# Patient Record
Sex: MALE | Race: WHITE
[De-identification: names, ages, dates, MRNs, and addresses within clinical notes are randomized per-mention and may not be internally consistent; named-entity substitution may affect disease eponyms.]

---

## 2018-02-14 ENCOUNTER — HOSPITAL ENCOUNTER (OUTPATIENT)
Dept: HOSPITAL 15 - LAB | Age: 68
Discharge: HOME | End: 2018-02-14
Attending: ORTHOPAEDIC SURGERY
Payer: MEDICARE

## 2018-02-14 DIAGNOSIS — S83.102D: Primary | ICD-10-CM

## 2018-02-14 DIAGNOSIS — Z79.01: ICD-10-CM

## 2018-02-14 LAB
ALBUMIN SERPL-MCNC: 4 G/DL (ref 3.4–5)
ALP SERPL-CCNC: 121 U/L (ref 45–117)
ALT SERPL-CCNC: 30 U/L (ref 16–61)
ANION GAP SERPL CALCULATED.3IONS-SCNC: 7 MMOL/L (ref 5–15)
APTT PPP: 25.9 SEC (ref 22.64–33.71)
BILIRUB SERPL-MCNC: 0.3 MG/DL (ref 0.2–1)
BUN SERPL-MCNC: 24 MG/DL (ref 7–18)
BUN/CREAT SERPL: 24.5
CALCIUM SERPL-MCNC: 9.6 MG/DL (ref 8.5–10.1)
CHLORIDE SERPL-SCNC: 106 MMOL/L (ref 98–107)
CO2 SERPL-SCNC: 27 MMOL/L (ref 21–32)
GLUCOSE SERPL-MCNC: 100 MG/DL (ref 74–106)
HCT VFR BLD AUTO: 42.7 % (ref 41–53)
HGB BLD-MCNC: 14.2 G/DL (ref 13.5–17.5)
INR PPP: 0.88 (ref 0.9–1.15)
MCH RBC QN AUTO: 30.7 PG (ref 28–32)
MCV RBC AUTO: 92.3 FL (ref 80–100)
NRBC BLD QL AUTO: 0 %
POTASSIUM SERPL-SCNC: 5 MMOL/L (ref 3.5–5.1)
PROT SERPL-MCNC: 7.8 G/DL (ref 6.4–8.2)
PROTHROMBIN TIME: 9.6 SEC (ref 9.37–12.3)
SODIUM SERPL-SCNC: 140 MMOL/L (ref 136–145)

## 2018-02-14 PROCEDURE — 85730 THROMBOPLASTIN TIME PARTIAL: CPT

## 2018-02-14 PROCEDURE — 36415 COLL VENOUS BLD VENIPUNCTURE: CPT

## 2018-02-14 PROCEDURE — 80053 COMPREHEN METABOLIC PANEL: CPT

## 2018-02-14 PROCEDURE — 85610 PROTHROMBIN TIME: CPT

## 2018-02-14 PROCEDURE — 85025 COMPLETE CBC W/AUTO DIFF WBC: CPT

## 2018-02-14 PROCEDURE — 81001 URINALYSIS AUTO W/SCOPE: CPT

## 2019-06-04 ENCOUNTER — HOSPITAL ENCOUNTER (OUTPATIENT)
Dept: HOSPITAL 15 - XYW | Age: 69
Discharge: HOME | End: 2019-06-04
Attending: INTERNAL MEDICINE
Payer: MEDICARE

## 2019-06-04 VITALS — SYSTOLIC BLOOD PRESSURE: 136 MMHG | DIASTOLIC BLOOD PRESSURE: 78 MMHG

## 2019-06-04 VITALS — BODY MASS INDEX: 28.31 KG/M2 | WEIGHT: 209 LBS | HEIGHT: 72 IN

## 2019-06-04 DIAGNOSIS — Z86.711: ICD-10-CM

## 2019-06-04 DIAGNOSIS — G43.909: ICD-10-CM

## 2019-06-04 DIAGNOSIS — I10: Primary | ICD-10-CM

## 2019-06-04 DIAGNOSIS — Z87.898: ICD-10-CM

## 2019-06-04 DIAGNOSIS — I49.8: ICD-10-CM

## 2019-06-04 PROCEDURE — 78452 HT MUSCLE IMAGE SPECT MULT: CPT

## 2019-06-04 PROCEDURE — 93017 CV STRESS TEST TRACING ONLY: CPT

## 2019-06-07 ENCOUNTER — HOSPITAL ENCOUNTER (OUTPATIENT)
Dept: HOSPITAL 15 - LAB | Age: 69
Discharge: HOME | End: 2019-06-07
Attending: INTERNAL MEDICINE
Payer: MEDICARE

## 2019-06-07 DIAGNOSIS — R79.1: ICD-10-CM

## 2019-06-07 DIAGNOSIS — R79.89: ICD-10-CM

## 2019-06-07 DIAGNOSIS — Z01.812: Primary | ICD-10-CM

## 2019-06-07 LAB
ALBUMIN SERPL-MCNC: 3.8 G/DL (ref 3.4–5)
ALP SERPL-CCNC: 133 U/L (ref 45–117)
ALT SERPL-CCNC: 53 U/L (ref 16–61)
ANION GAP SERPL CALCULATED.3IONS-SCNC: 8 MMOL/L (ref 5–15)
APTT PPP: 27.2 SEC (ref 23.64–32.05)
BILIRUB SERPL-MCNC: 0.3 MG/DL (ref 0.2–1)
BUN SERPL-MCNC: 18 MG/DL (ref 7–18)
BUN/CREAT SERPL: 15.5
CALCIUM SERPL-MCNC: 8.8 MG/DL (ref 8.5–10.1)
CHLORIDE SERPL-SCNC: 108 MMOL/L (ref 98–107)
CO2 SERPL-SCNC: 26 MMOL/L (ref 21–32)
GLUCOSE SERPL-MCNC: 111 MG/DL (ref 74–106)
HCT VFR BLD AUTO: 42.9 % (ref 41–53)
HGB BLD-MCNC: 14.3 G/DL (ref 13.5–17.5)
INR PPP: 0.89 (ref 0.9–1.15)
MCH RBC QN AUTO: 30.8 PG (ref 28–32)
MCV RBC AUTO: 92.3 FL (ref 80–100)
NRBC BLD QL AUTO: 0.1 %
POTASSIUM SERPL-SCNC: 4.4 MMOL/L (ref 3.5–5.1)
PROT SERPL-MCNC: 7.6 G/DL (ref 6.4–8.2)
SODIUM SERPL-SCNC: 142 MMOL/L (ref 136–145)

## 2019-06-07 PROCEDURE — 80053 COMPREHEN METABOLIC PANEL: CPT

## 2019-06-07 PROCEDURE — 85025 COMPLETE CBC W/AUTO DIFF WBC: CPT

## 2019-06-07 PROCEDURE — 36415 COLL VENOUS BLD VENIPUNCTURE: CPT

## 2019-06-07 PROCEDURE — 85730 THROMBOPLASTIN TIME PARTIAL: CPT

## 2019-06-07 PROCEDURE — 85610 PROTHROMBIN TIME: CPT

## 2019-06-10 ENCOUNTER — HOSPITAL ENCOUNTER (OUTPATIENT)
Dept: HOSPITAL 15 - CATH | Age: 69
Discharge: HOME | End: 2019-06-10
Attending: INTERNAL MEDICINE
Payer: MEDICARE

## 2019-06-10 VITALS — HEIGHT: 69 IN | WEIGHT: 211 LBS | BODY MASS INDEX: 31.25 KG/M2

## 2019-06-10 DIAGNOSIS — G43.909: ICD-10-CM

## 2019-06-10 DIAGNOSIS — I48.91: Primary | ICD-10-CM

## 2019-06-10 DIAGNOSIS — Z88.0: ICD-10-CM

## 2019-06-10 DIAGNOSIS — Z82.49: ICD-10-CM

## 2019-06-10 DIAGNOSIS — Z79.899: ICD-10-CM

## 2019-06-10 DIAGNOSIS — Z86.711: ICD-10-CM

## 2019-06-10 DIAGNOSIS — Z87.898: ICD-10-CM

## 2019-06-10 DIAGNOSIS — G47.33: ICD-10-CM

## 2019-06-10 DIAGNOSIS — J45.909: ICD-10-CM

## 2019-06-10 DIAGNOSIS — I10: ICD-10-CM

## 2019-06-10 PROCEDURE — 92960 CARDIOVERSION ELECTRIC EXT: CPT

## 2019-06-10 PROCEDURE — 93312 ECHO TRANSESOPHAGEAL: CPT

## 2019-06-10 PROCEDURE — 36415 COLL VENOUS BLD VENIPUNCTURE: CPT

## 2019-06-10 PROCEDURE — 93005 ELECTROCARDIOGRAM TRACING: CPT

## 2019-06-10 PROCEDURE — 80162 ASSAY OF DIGOXIN TOTAL: CPT

## 2019-06-10 PROCEDURE — 99152 MOD SED SAME PHYS/QHP 5/>YRS: CPT

## 2020-01-28 ENCOUNTER — HOSPITAL ENCOUNTER (OUTPATIENT)
Dept: HOSPITAL 15 - XY | Age: 70
Discharge: HOME | End: 2020-01-28
Attending: INTERNAL MEDICINE
Payer: MEDICARE

## 2020-01-28 VITALS — SYSTOLIC BLOOD PRESSURE: 149 MMHG | DIASTOLIC BLOOD PRESSURE: 92 MMHG

## 2020-01-28 VITALS — BODY MASS INDEX: 31.25 KG/M2 | WEIGHT: 211 LBS | HEIGHT: 69 IN

## 2020-01-28 DIAGNOSIS — I48.91: ICD-10-CM

## 2020-01-28 DIAGNOSIS — I10: Primary | ICD-10-CM

## 2020-01-28 PROCEDURE — 93017 CV STRESS TEST TRACING ONLY: CPT

## 2020-01-28 PROCEDURE — 78452 HT MUSCLE IMAGE SPECT MULT: CPT

## 2020-01-31 ENCOUNTER — HOSPITAL ENCOUNTER (OUTPATIENT)
Dept: HOSPITAL 15 - XYW | Age: 70
Discharge: HOME | End: 2020-01-31
Attending: INTERNAL MEDICINE
Payer: MEDICARE

## 2020-01-31 DIAGNOSIS — I11.9: Primary | ICD-10-CM

## 2020-01-31 PROCEDURE — 93306 TTE W/DOPPLER COMPLETE: CPT

## 2020-03-02 ENCOUNTER — HOSPITAL ENCOUNTER (OUTPATIENT)
Dept: HOSPITAL 15 - LAB | Age: 70
Discharge: HOME | End: 2020-03-02
Attending: INTERNAL MEDICINE
Payer: MEDICARE

## 2020-03-02 DIAGNOSIS — I10: Primary | ICD-10-CM

## 2020-03-02 DIAGNOSIS — Z87.898: ICD-10-CM

## 2020-03-02 LAB
ALBUMIN SERPL-MCNC: 3.7 G/DL (ref 3.4–5)
ALP SERPL-CCNC: 157 U/L (ref 45–117)
ALT SERPL-CCNC: 59 U/L (ref 16–61)
ANION GAP SERPL CALCULATED.3IONS-SCNC: 4 MMOL/L (ref 5–15)
BILIRUB SERPL-MCNC: 0.4 MG/DL (ref 0.2–1)
BUN SERPL-MCNC: 22 MG/DL (ref 7–18)
BUN/CREAT SERPL: 19.6
CALCIUM SERPL-MCNC: 9 MG/DL (ref 8.5–10.1)
CHLORIDE SERPL-SCNC: 107 MMOL/L (ref 98–107)
CHOLEST SERPL-MCNC: 215 MG/DL (ref ?–200)
CO2 SERPL-SCNC: 28 MMOL/L (ref 21–32)
GLUCOSE SERPL-MCNC: 97 MG/DL (ref 74–106)
HCT VFR BLD AUTO: 41.2 % (ref 41–53)
HDLC SERPL-MCNC: 61 MG/DL (ref 40–59)
HGB BLD-MCNC: 13.6 G/DL (ref 13.5–17.5)
MCH RBC QN AUTO: 30.6 PG (ref 28–32)
MCV RBC AUTO: 92.9 FL (ref 80–100)
NRBC BLD QL AUTO: 0 %
POTASSIUM SERPL-SCNC: 4.2 MMOL/L (ref 3.5–5.1)
PROT SERPL-MCNC: 7.5 G/DL (ref 6.4–8.2)
SODIUM SERPL-SCNC: 139 MMOL/L (ref 136–145)
TRIGL SERPL-MCNC: 124 MG/DL (ref ?–150)

## 2020-03-02 PROCEDURE — 85025 COMPLETE CBC W/AUTO DIFF WBC: CPT

## 2020-03-02 PROCEDURE — 80061 LIPID PANEL: CPT

## 2020-03-02 PROCEDURE — 36415 COLL VENOUS BLD VENIPUNCTURE: CPT

## 2020-03-02 PROCEDURE — 80053 COMPREHEN METABOLIC PANEL: CPT

## 2020-06-01 LAB
ALBUMIN SERPL-MCNC: 3.5 G/DL (ref 3.4–5)
ALP SERPL-CCNC: 117 U/L (ref 45–117)
ALT SERPL-CCNC: 60 U/L (ref 16–61)
ANION GAP SERPL CALCULATED.3IONS-SCNC: 4 MMOL/L (ref 5–15)
APTT PPP: 26.2 SEC (ref 23.64–32.05)
BILIRUB SERPL-MCNC: 0.4 MG/DL (ref 0.2–1)
BUN SERPL-MCNC: 16 MG/DL (ref 7–18)
BUN/CREAT SERPL: 13.8
CALCIUM SERPL-MCNC: 8.9 MG/DL (ref 8.5–10.1)
CHLORIDE SERPL-SCNC: 110 MMOL/L (ref 98–107)
CO2 SERPL-SCNC: 29 MMOL/L (ref 21–32)
GLUCOSE SERPL-MCNC: 99 MG/DL (ref 74–106)
HCT VFR BLD AUTO: 37.4 % (ref 41–53)
HGB BLD-MCNC: 12.3 G/DL (ref 13.5–17.5)
INR PPP: 0.97 (ref 0.9–1.15)
MCH RBC QN AUTO: 30.1 PG (ref 28–32)
MCV RBC AUTO: 91.3 FL (ref 80–100)
NRBC BLD QL AUTO: 0 %
POTASSIUM SERPL-SCNC: 4.5 MMOL/L (ref 3.5–5.1)
PROT SERPL-MCNC: 7.3 G/DL (ref 6.4–8.2)
SODIUM SERPL-SCNC: 143 MMOL/L (ref 136–145)

## 2020-06-03 ENCOUNTER — HOSPITAL ENCOUNTER (INPATIENT)
Dept: HOSPITAL 15 - CATH | Age: 70
LOS: 1 days | Discharge: HOME | DRG: 246 | End: 2020-06-04
Attending: INTERNAL MEDICINE | Admitting: INTERNAL MEDICINE
Payer: MEDICARE

## 2020-06-03 VITALS — HEIGHT: 69 IN | WEIGHT: 197.31 LBS | BODY MASS INDEX: 29.22 KG/M2

## 2020-06-03 VITALS — SYSTOLIC BLOOD PRESSURE: 135 MMHG | DIASTOLIC BLOOD PRESSURE: 82 MMHG

## 2020-06-03 VITALS — SYSTOLIC BLOOD PRESSURE: 138 MMHG | DIASTOLIC BLOOD PRESSURE: 81 MMHG

## 2020-06-03 DIAGNOSIS — Z11.59: ICD-10-CM

## 2020-06-03 DIAGNOSIS — I25.42: ICD-10-CM

## 2020-06-03 DIAGNOSIS — J45.909: ICD-10-CM

## 2020-06-03 DIAGNOSIS — I25.10: Primary | ICD-10-CM

## 2020-06-03 DIAGNOSIS — Z88.1: ICD-10-CM

## 2020-06-03 DIAGNOSIS — Z88.0: ICD-10-CM

## 2020-06-03 DIAGNOSIS — E78.5: ICD-10-CM

## 2020-06-03 DIAGNOSIS — Z86.711: ICD-10-CM

## 2020-06-03 DIAGNOSIS — I10: ICD-10-CM

## 2020-06-03 DIAGNOSIS — I48.91: ICD-10-CM

## 2020-06-03 PROCEDURE — 92928 PRQ TCAT PLMT NTRAC ST 1 LES: CPT

## 2020-06-03 PROCEDURE — 85025 COMPLETE CBC W/AUTO DIFF WBC: CPT

## 2020-06-03 PROCEDURE — 82565 ASSAY OF CREATININE: CPT

## 2020-06-03 PROCEDURE — 85610 PROTHROMBIN TIME: CPT

## 2020-06-03 PROCEDURE — 99152 MOD SED SAME PHYS/QHP 5/>YRS: CPT

## 2020-06-03 PROCEDURE — 80053 COMPREHEN METABOLIC PANEL: CPT

## 2020-06-03 PROCEDURE — B211YZZ FLUOROSCOPY OF MULTIPLE CORONARY ARTERIES USING OTHER CONTRAST: ICD-10-PCS | Performed by: INTERNAL MEDICINE

## 2020-06-03 PROCEDURE — 36415 COLL VENOUS BLD VENIPUNCTURE: CPT

## 2020-06-03 PROCEDURE — B241ZZ3 ULTRASONOGRAPHY OF MULTIPLE CORONARY ARTERIES, INTRAVASCULAR: ICD-10-PCS | Performed by: INTERNAL MEDICINE

## 2020-06-03 PROCEDURE — 4A023N8 MEASUREMENT OF CARDIAC SAMPLING AND PRESSURE, BILATERAL, PERCUTANEOUS APPROACH: ICD-10-PCS | Performed by: INTERNAL MEDICINE

## 2020-06-03 PROCEDURE — 92979 ENDOLUMINL IVUS OCT C EA: CPT

## 2020-06-03 PROCEDURE — 85730 THROMBOPLASTIN TIME PARTIAL: CPT

## 2020-06-03 PROCEDURE — B215YZZ FLUOROSCOPY OF LEFT HEART USING OTHER CONTRAST: ICD-10-PCS | Performed by: INTERNAL MEDICINE

## 2020-06-03 PROCEDURE — 027236Z DILATION OF CORONARY ARTERY, THREE ARTERIES WITH THREE DRUG-ELUTING INTRALUMINAL DEVICES, PERCUTANEOUS APPROACH: ICD-10-PCS | Performed by: INTERNAL MEDICINE

## 2020-06-03 PROCEDURE — 92978 ENDOLUMINL IVUS OCT C 1ST: CPT

## 2020-06-03 PROCEDURE — 93460 R&L HRT ART/VENTRICLE ANGIO: CPT

## 2020-06-03 PROCEDURE — 99153 MOD SED SAME PHYS/QHP EA: CPT

## 2020-06-04 VITALS — DIASTOLIC BLOOD PRESSURE: 79 MMHG | SYSTOLIC BLOOD PRESSURE: 130 MMHG

## 2020-06-04 VITALS — SYSTOLIC BLOOD PRESSURE: 136 MMHG | DIASTOLIC BLOOD PRESSURE: 86 MMHG

## 2020-06-04 VITALS — DIASTOLIC BLOOD PRESSURE: 86 MMHG | SYSTOLIC BLOOD PRESSURE: 136 MMHG

## 2020-06-04 VITALS — DIASTOLIC BLOOD PRESSURE: 89 MMHG | SYSTOLIC BLOOD PRESSURE: 159 MMHG

## 2020-11-12 ENCOUNTER — HOSPITAL ENCOUNTER (OUTPATIENT)
Dept: HOSPITAL 15 - XY | Age: 70
Discharge: HOME | End: 2020-11-12
Attending: INTERNAL MEDICINE
Payer: MEDICARE

## 2020-11-12 VITALS — BODY MASS INDEX: 30.66 KG/M2 | WEIGHT: 207 LBS | HEIGHT: 69 IN

## 2020-11-12 DIAGNOSIS — M17.10: ICD-10-CM

## 2020-11-12 DIAGNOSIS — I25.10: Primary | ICD-10-CM

## 2020-11-12 DIAGNOSIS — Z95.9: ICD-10-CM

## 2020-11-12 DIAGNOSIS — R06.02: ICD-10-CM

## 2020-11-12 PROCEDURE — 93017 CV STRESS TEST TRACING ONLY: CPT

## 2020-11-12 PROCEDURE — 78452 HT MUSCLE IMAGE SPECT MULT: CPT

## 2021-06-01 ENCOUNTER — HOSPITAL ENCOUNTER (OUTPATIENT)
Dept: HOSPITAL 15 - LAB | Age: 71
Discharge: HOME | End: 2021-06-01
Attending: INTERNAL MEDICINE
Payer: MEDICARE

## 2021-06-01 DIAGNOSIS — Z01.812: Primary | ICD-10-CM

## 2021-06-01 LAB
ALBUMIN SERPL-MCNC: 3.6 G/DL (ref 3.4–5)
ALP SERPL-CCNC: 118 U/L (ref 45–117)
ALT SERPL-CCNC: 51 U/L (ref 16–61)
ANION GAP SERPL CALCULATED.3IONS-SCNC: 7 MMOL/L (ref 5–15)
APTT PPP: 26.9 SEC (ref 23–31.2)
BILIRUB SERPL-MCNC: 0.5 MG/DL (ref 0.2–1)
BUN SERPL-MCNC: 27 MG/DL (ref 7–18)
BUN/CREAT SERPL: 25
CALCIUM SERPL-MCNC: 8.8 MG/DL (ref 8.5–10.1)
CHLORIDE SERPL-SCNC: 106 MMOL/L (ref 98–107)
CO2 SERPL-SCNC: 24 MMOL/L (ref 21–32)
GLUCOSE SERPL-MCNC: 105 MG/DL (ref 74–106)
HCT VFR BLD AUTO: 36.9 % (ref 41–53)
HGB BLD-MCNC: 12.5 G/DL (ref 13.5–17.5)
INR PPP: 0.93 (ref 0.9–1.15)
MCH RBC QN AUTO: 31.3 PG (ref 28–32)
MCV RBC AUTO: 92.2 FL (ref 80–100)
NRBC BLD QL AUTO: 0.1 %
POTASSIUM SERPL-SCNC: 4.5 MMOL/L (ref 3.5–5.1)
PROT SERPL-MCNC: 7.3 G/DL (ref 6.4–8.2)
SODIUM SERPL-SCNC: 137 MMOL/L (ref 136–145)

## 2021-06-01 PROCEDURE — 85025 COMPLETE CBC W/AUTO DIFF WBC: CPT

## 2021-06-01 PROCEDURE — 36415 COLL VENOUS BLD VENIPUNCTURE: CPT

## 2021-06-01 PROCEDURE — 81001 URINALYSIS AUTO W/SCOPE: CPT

## 2021-06-01 PROCEDURE — 80053 COMPREHEN METABOLIC PANEL: CPT

## 2021-06-01 PROCEDURE — 85610 PROTHROMBIN TIME: CPT

## 2021-06-01 PROCEDURE — 85730 THROMBOPLASTIN TIME PARTIAL: CPT

## 2022-02-11 ENCOUNTER — HOSPITAL ENCOUNTER (OUTPATIENT)
Dept: HOSPITAL 15 - LAB | Age: 72
Discharge: HOME | End: 2022-02-11
Attending: INTERNAL MEDICINE
Payer: MEDICARE

## 2022-02-11 DIAGNOSIS — I25.810: Primary | ICD-10-CM

## 2022-02-11 DIAGNOSIS — Z95.5: ICD-10-CM

## 2022-02-11 LAB
ALBUMIN SERPL-MCNC: 3.7 G/DL (ref 3.4–5)
ALP SERPL-CCNC: 103 U/L (ref 45–117)
ALT SERPL-CCNC: 32 U/L (ref 16–61)
ANION GAP SERPL CALCULATED.3IONS-SCNC: 4 MMOL/L (ref 5–15)
BILIRUB SERPL-MCNC: 0.5 MG/DL (ref 0.2–1)
BUN SERPL-MCNC: 16 MG/DL (ref 7–18)
BUN/CREAT SERPL: 17
CALCIUM SERPL-MCNC: 9.1 MG/DL (ref 8.5–10.1)
CHLORIDE SERPL-SCNC: 112 MMOL/L (ref 98–107)
CO2 SERPL-SCNC: 26 MMOL/L (ref 21–32)
GLUCOSE SERPL-MCNC: 117 MG/DL (ref 74–106)
POTASSIUM SERPL-SCNC: 4 MMOL/L (ref 3.5–5.1)
PROT SERPL-MCNC: 7.2 G/DL (ref 6.4–8.2)
SODIUM SERPL-SCNC: 142 MMOL/L (ref 136–145)

## 2022-02-11 PROCEDURE — 80053 COMPREHEN METABOLIC PANEL: CPT

## 2022-02-11 PROCEDURE — 36415 COLL VENOUS BLD VENIPUNCTURE: CPT

## 2025-01-24 ENCOUNTER — HOSPITAL ENCOUNTER (INPATIENT)
Dept: HOSPITAL 15 - ER | Age: 75
LOS: 4 days | Discharge: HOME | DRG: 640 | End: 2025-01-28
Attending: STUDENT IN AN ORGANIZED HEALTH CARE EDUCATION/TRAINING PROGRAM | Admitting: STUDENT IN AN ORGANIZED HEALTH CARE EDUCATION/TRAINING PROGRAM
Payer: MEDICARE

## 2025-01-24 VITALS — RESPIRATION RATE: 18 BRPM | HEART RATE: 79 BPM | OXYGEN SATURATION: 99 %

## 2025-01-24 VITALS — WEIGHT: 228.62 LBS | BODY MASS INDEX: 33.86 KG/M2 | HEIGHT: 69 IN

## 2025-01-24 DIAGNOSIS — G20.A1: ICD-10-CM

## 2025-01-24 DIAGNOSIS — Z82.49: ICD-10-CM

## 2025-01-24 DIAGNOSIS — Z95.5: ICD-10-CM

## 2025-01-24 DIAGNOSIS — Z90.49: ICD-10-CM

## 2025-01-24 DIAGNOSIS — I13.2: ICD-10-CM

## 2025-01-24 DIAGNOSIS — I50.32: ICD-10-CM

## 2025-01-24 DIAGNOSIS — I25.10: ICD-10-CM

## 2025-01-24 DIAGNOSIS — Z83.3: ICD-10-CM

## 2025-01-24 DIAGNOSIS — E87.6: ICD-10-CM

## 2025-01-24 DIAGNOSIS — E83.52: Primary | ICD-10-CM

## 2025-01-24 DIAGNOSIS — N17.0: ICD-10-CM

## 2025-01-24 DIAGNOSIS — I48.91: ICD-10-CM

## 2025-01-24 DIAGNOSIS — K35.80: ICD-10-CM

## 2025-01-24 DIAGNOSIS — E67.8: ICD-10-CM

## 2025-01-24 LAB
ANION GAP SERPL CALCULATED.3IONS-SCNC: 7 MMOL/L (ref 5–15)
ANION GAP SERPL CALCULATED.3IONS-SCNC: 7 MMOL/L (ref 5–15)
APTT PPP: 24.3 SEC (ref 24.5–34.5)
BUN SERPL-MCNC: 27 MG/DL (ref 9–23)
BUN SERPL-MCNC: 28 MG/DL (ref 9–23)
BUN/CREAT SERPL: 20.1 (ref 10–20)
BUN/CREAT SERPL: 23.1 (ref 10–20)
CALCIUM SERPL-MCNC: 13.9 MG/DL (ref 8.7–10.4)
CALCIUM SERPL-MCNC: 15.2 MG/DL (ref 8.7–10.4)
CHLORIDE SERPL-SCNC: 101 MMOL/L (ref 98–107)
CHLORIDE SERPL-SCNC: 97 MMOL/L (ref 98–107)
CO2 SERPL-SCNC: 32 MMOL/L (ref 20–31)
CO2 SERPL-SCNC: 34 MMOL/L (ref 20–31)
GLUCOSE SERPL-MCNC: 95 MG/DL (ref 74–106)
GLUCOSE SERPL-MCNC: 96 MG/DL (ref 74–106)
HCT VFR BLD AUTO: 38.7 % (ref 41–53)
HGB BLD-MCNC: 13 G/DL (ref 13.5–17.5)
INR PPP: 1.01 (ref 0.9–1.15)
MAGNESIUM SERPL-MCNC: 1.9 MG/DL (ref 1.6–2.6)
MCH RBC QN AUTO: 29 PG (ref 28–32)
MCV RBC AUTO: 85.8 FL (ref 80–100)
NRBC BLD QL AUTO: 0.1 %
POTASSIUM SERPL-SCNC: 2.9 MMOL/L (ref 3.5–5.1)
POTASSIUM SERPL-SCNC: 2.9 MMOL/L (ref 3.5–5.1)
PROTHROMBIN TIME: 10.7 SEC (ref 9.3–11.8)
SODIUM SERPL-SCNC: 138 MMOL/L (ref 136–145)
SODIUM SERPL-SCNC: 140 MMOL/L (ref 136–145)

## 2025-01-24 PROCEDURE — 82040 ASSAY OF SERUM ALBUMIN: CPT

## 2025-01-24 PROCEDURE — 83735 ASSAY OF MAGNESIUM: CPT

## 2025-01-24 PROCEDURE — 36415 COLL VENOUS BLD VENIPUNCTURE: CPT

## 2025-01-24 PROCEDURE — 83835 ASSAY OF METANEPHRINES: CPT

## 2025-01-24 PROCEDURE — 85610 PROTHROMBIN TIME: CPT

## 2025-01-24 PROCEDURE — 84443 ASSAY THYROID STIM HORMONE: CPT

## 2025-01-24 PROCEDURE — 83615 LACTATE (LD) (LDH) ENZYME: CPT

## 2025-01-24 PROCEDURE — 74176 CT ABD & PELVIS W/O CONTRAST: CPT

## 2025-01-24 PROCEDURE — 83970 ASSAY OF PARATHORMONE: CPT

## 2025-01-24 PROCEDURE — 84155 ASSAY OF PROTEIN SERUM: CPT

## 2025-01-24 PROCEDURE — 74177 CT ABD & PELVIS W/CONTRAST: CPT

## 2025-01-24 PROCEDURE — 83605 ASSAY OF LACTIC ACID: CPT

## 2025-01-24 PROCEDURE — 80053 COMPREHEN METABOLIC PANEL: CPT

## 2025-01-24 PROCEDURE — 82088 ASSAY OF ALDOSTERONE: CPT

## 2025-01-24 PROCEDURE — 81001 URINALYSIS AUTO W/SCOPE: CPT

## 2025-01-24 PROCEDURE — 97163 PT EVAL HIGH COMPLEX 45 MIN: CPT

## 2025-01-24 PROCEDURE — 71260 CT THORAX DX C+: CPT

## 2025-01-24 PROCEDURE — 97110 THERAPEUTIC EXERCISES: CPT

## 2025-01-24 PROCEDURE — 85730 THROMBOPLASTIN TIME PARTIAL: CPT

## 2025-01-24 PROCEDURE — 84154 ASSAY OF PSA FREE: CPT

## 2025-01-24 PROCEDURE — 82378 CARCINOEMBRYONIC ANTIGEN: CPT

## 2025-01-24 PROCEDURE — 84100 ASSAY OF PHOSPHORUS: CPT

## 2025-01-24 PROCEDURE — 84132 ASSAY OF SERUM POTASSIUM: CPT

## 2025-01-24 PROCEDURE — 93306 TTE W/DOPPLER COMPLETE: CPT

## 2025-01-24 PROCEDURE — 82310 ASSAY OF CALCIUM: CPT

## 2025-01-24 PROCEDURE — 82652 VIT D 1 25-DIHYDROXY: CPT

## 2025-01-24 PROCEDURE — 87086 URINE CULTURE/COLONY COUNT: CPT

## 2025-01-24 PROCEDURE — 80048 BASIC METABOLIC PNL TOTAL CA: CPT

## 2025-01-24 PROCEDURE — 82306 VITAMIN D 25 HYDROXY: CPT

## 2025-01-24 PROCEDURE — 71045 X-RAY EXAM CHEST 1 VIEW: CPT

## 2025-01-24 PROCEDURE — 84244 ASSAY OF RENIN: CPT

## 2025-01-24 PROCEDURE — 87040 BLOOD CULTURE FOR BACTERIA: CPT

## 2025-01-24 PROCEDURE — 83880 ASSAY OF NATRIURETIC PEPTIDE: CPT

## 2025-01-24 PROCEDURE — 97116 GAIT TRAINING THERAPY: CPT

## 2025-01-24 PROCEDURE — 85025 COMPLETE CBC W/AUTO DIFF WBC: CPT

## 2025-01-24 PROCEDURE — 93975 VASCULAR STUDY: CPT

## 2025-01-24 RX ADMIN — METOPROLOL SUCCINATE SCH MG: 50 TABLET, EXTENDED RELEASE ORAL at 22:16

## 2025-01-24 RX ADMIN — HYDRALAZINE HYDROCHLORIDE ONE MG: 20 INJECTION INTRAMUSCULAR; INTRAVENOUS at 22:29

## 2025-01-24 RX ADMIN — SODIUM CHLORIDE ONE MLS/HR: 0.9 INJECTION, SOLUTION INTRAVENOUS at 16:49

## 2025-01-24 RX ADMIN — SODIUM CHLORIDE SCH MLS/HR: 0.9 INJECTION, SOLUTION INTRAVENOUS at 22:15

## 2025-01-24 RX ADMIN — POTASSIUM CHLORIDE SCH MLS/HR: 200 INJECTION, SOLUTION INTRAVENOUS at 03:30

## 2025-01-24 NOTE — ED.PDOC
GI ASSESSMENT


HPI Comments


74-year-old male who comes in with chief complaint of abdominal pain with 

possible UTI.  The patient has had a low-grade fever according to his family.  

The patient has been more lethargic.  The patient's pain is in the left lower 

quadrant and seems to radiate towards his back.  The pain has been increasing 

somewhat over the past two weeks but the odiferous urine has been going on for 

two days.  The patient's blood pressure was also significantly elevated.


Chief Complaint:  Urinary


Time Seen by MD:  15:16


Primary Care Provider:  unknown


Reviewed Notes:  Nurses Notes, Paramedic Notes, Medications, Allergies 

(Allergies listed above)


Allergies:  


Coded Allergies:  


     Cephalexin (Verified  Allergy, Unknown, RASH, 6/7/19)


     Penicillins (Verified  Allergy, Unknown, RASH, 6/7/19)


Home Meds


Active Scripts


Apixaban Base (ELIQUIS) 5 Mg Tab, 5 MG PO BID for 30 Days, #60 TAB


   Prov:MORAIMA DAY MD         4/22/19


Reported Medications


Aspirin-Caffeine (Anacin 400-32 mg) 1 Tab Tab, 1 TAB PO DAILY, TAB


   6/1/20


Hydrocodone-Acetaminophen (Norco  mg) 1 Tab Tab, 1 TAB PO BID, TAB


   6/1/20


Rosuvastatin Calcium (Crestor) 40 Mg Tab, 1 TAB PO DAILY, #30 TAB 5 Refills


   6/1/20


Losartan Potassium (Losartan Potassium) 100 Mg Tab, 100 MG PO DAILY for 30 Days,

MG


   6/1/20


Pantoprazole Sodium Sesquihydr (Pantoprazole Sodium) 40 Mg Tab, 40 MG PO DAILY, 

TAB


   6/7/19


Metoprolol Succinate (Metoprolol Succinate Er) 50 Mg Tab, 50 MG PO DAILY for 30 

Days, MG


   6/7/19


Colestipol Hcl (Colestipol Hcl) 1 Gm Tab, 1 GM PO DAILYP, TAB


   4/16/19


Information Source:  Patient, Emergency Med Personnel


Mode of Arrival:  EMS


Timing:  Days


Duration:  Since onset


Prehospital treatment:  None


Quality:  Aching, Cramping


Vomitus:  None


Stool:  Normal


Severity:  Moderate


Recent:  None


Recent Hx of:  None


Pain Location:  LLQ


Modifying Factors:  Nothing


Associated sign and symptoms:  Nausea, Abdominal Pain, Other (Dysuria)





Past Medical History


PAST MEDICAL HISTORY:  HTN, MI


Past Medical History (Other):  


Parkinson's, chronic ulcerative colitis


Surgical History:  Cholecystectomy, PTCA, Tonsillectomy


Surgical History (Other):  


Left knee surgery





Family History


Family History:  No family hx of DM





Social History


Smoker:  Non-Smoker


Alcohol:  Denies ETOH Use


Drugs:  Denies Drug Use


Lives In:  Home





Constitutional:  denies: chills, diaphoresis, fatigue, fever, malaise, sweats, 

weakness, others





Physical Exam


General Appearance:  Moderate Distress


HEENT:  Normal ENT Inspection, Pharynx Normal, TMs Normal


Neck:  Full Range of Motion, Non-Tender, Normal, Normal Inspection


Respiratory:  Chest Non-Tender, Lungs Clear, No Accessory Muscle Use, No 

Respiratory Distress, Normal Breath Sounds


Cardiovascular:  No Edema, No JVD, No Murmur, No Gallop, Normal Peripheral 

Pulses, Regular Rate/Rhythm


Breast Exam:  Deferred


Gastrointestinal:  LLQ, No Organomegaly, No Pulsatile Mass, Normal Bowel Sounds,

RLQ, Soft, Tenderness


Genitalia:  Deferred


Pelvic:  Deferred


Rectal:  Deferred


Extremities:  No calf tenderness, Normal capillary refill, No pedal edema


Musculoskeletal :  


   Apperance:  Normal


Neurologic:  CNs II-XII nml as Tested, Motor Weakness, No Sensory Deficits, 

Other (Patient was alert but confused)


Cerebellar Function:  Unable to Test


Reflexes:  Normal


Skin:  Dry, Normal Color, Warm


Lymphatic:  No Adenopathy





Was a procedure done?


Was a procedure done?:  No





GI differential Dx


Differential Diagnosis:  Gastritis/PUD, Gastroenteritis, Inflammatory BD, 

Pancreatitis, UTI, Electrolyte Imbalance, Food Poisoning





X-Ray, Labs, Meds, VS





                                   Vital Signs








  Date Time  Temp Pulse Resp B/P (MAP) Pulse Ox O2 Delivery O2 Flow Rate FiO2


 


1/24/25 16:49  79 18  99 Room Air* 0 21


 


1/24/25 16:47 98.7 79 17 166/87 (113) 97   





 98.7       


 


1/24/25 16:47  79 17  97   


 


1/24/25 15:08 97.3 78 18 182/90 (120) 97   








                                       Lab








Test


 1/24/25


16:08 1/24/25


15:28 Range/Units


 


 


White Blood Count


 9.6 


 


 4.4-10.8


10^3/uL


 


Red Blood Count


 4.51 


 


 4.5-5.90


10^6/uL


 


Hemoglobin 13.0 L  13.5-17.5  g/dL


 


Hematocrit 38.7 L  41.0-53.0  %


 


Mean Corpuscular Volume 85.8   80.0-100.0  fL


 


Mean Corpuscular Hemoglobin 29.0   28.0-32.0  pg


 


Mean Corpuscular Hemoglobin


Concent 33.8 


 


 32.0-36.0  g/dL





 


Red Cell Distribution Width 14.1   11.8-14.3  %


 


Platelet Count


 299 


 


 140-450


10^3/uL


 


Mean Platelet Volume 7.4   6.9-10.8  fL


 


Neutrophils (%) (Auto) 73.6   37.0-80.0  %


 


Lymphocytes (%) (Auto) 11.4   10.0-50.0  %


 


Monocytes (%) (Auto) 9.2   0.0-12.0  %


 


Eosinophils (%) (Auto) 5.0   0.0-7.0  %


 


Basophils (%) (Auto) 0.8   0.0-2.0  %


 


Neutrophils # (Auto)


 7.1 


 


 1.6-8.6  10


^3/uL


 


Lymphocytes # (Auto)


 1.1 


 


 0.4-5.4  10


^3/uL


 


Monocytes # (Auto) 0.9   0-1.3  10 ^3/uL


 


Eosinophils # (Auto) 0.5   0-0.8  10 ^3/uL


 


Basophils # (Auto) 0.1   0-0.2  10 ^3/uL


 


Nucleated Red Blood Cells 0.1    %


 


Prothrombin Time 10.7   9.3-11.8  sec


 


Prothrombin Time INR 1.01   0.9-1.15  


 


Activated Partial


Thromboplast Time 24.3 L


 


 24.5-34.5  SEC





 


Sodium Level 138   136-145  mmol/L


 


Potassium Level 2.9 L  3.5-5.1  mmol/L


 


Chloride Level 97 L    mmol/L


 


Carbon Dioxide Level 34 H  20-31  mmol/L


 


Anion Gap 7   5-15  


 


Blood Urea Nitrogen 27 H  9-23  mg/dL


 


Creatinine


 1.34 H


 


 0.700-1.30


mg/dL


 


Glomerular Filtration Rate


Calc 56 


 


 >90  mL/min





 


BUN/Creatinine Ratio 20.1 H  10.0-20.0  


 


Serum Glucose 96     mg/dL


 


Lactic Acid Level 1.7   0.4-2.0  mmol/L


 


Calcium Level 15.2 *H  8.7-10.4  mg/dL


 


Urine Color  Dark-yellow  Yellow  


 


Urine Clarity  Clear  Clear  


 


Urine pH  5.5  5.0-9.0  


 


Urine Specific Gravity  1.011  1.001-1.035  


 


Urine Protein  Negative  Negative  


 


Urine Ketones  Negative  Negative  


 


Urine Blood  Negative  Negative  /uL


 


Urine Nitrite  1+ H Negative  


 


Urine Bilirubin  1+  Negative  


 


Urine Urobilinogen  3 H Negative  mg/dL


 


Urine Leukocyte Esterase  Negative  Negative  /uL


 


Urine RBC  4  0 - 3  /hpf


 


Urine Microscopic WBC  2  0-3  /HPF


 


Urine Squamous Epithelial


Cells 


 Few 


 <5  /hpf





 


Urine Bacteria  None seen  None Seen  /hpf


 


Urine Glucose  Normal  Normal  mg/dL








                               Current Medications








 Medications


  (Trade)  Dose


 Ordered  Sig/Carmen


 Route  Start Time


 Stop Time Status Last Admin


 


 Sodium Chloride  1,000 ml @ 


 1,000 mls/hr  Q1H ONCE


 IV  1/24/25 15:30


 1/24/25 16:29 DC 1/24/25 16:49





 


 Metronidazole  100 ml @ 


 100 mls/hr  ONCE  ONCE


 IV  1/24/25 17:30


 1/24/25 18:29 DC 1/24/25 17:38





 


 Vancomycin HCl  250 ml @ 


 250 mls/hr  ONCE  ONCE


 IV  1/24/25 17:30


 1/24/25 18:29 DC 1/24/25 17:30








IV Hep-Lock was established 


The patient was given a 1 L bolus of normal saline.  


The patient was given Flagyl 500 mg IV piggyback 


The patient was hypercalcemic at 15.2 


The patient's urine test is negative for infection at this time 


The patient's CBC is within normal limits 


We did get a CT scan of the abdomen and pelvis on this patient and there are 

some inflammatory changes in the area around the appendix.  This is consistent 

with acute appendicitis.  


We contacted the surgeon on-call and the patient will be admitted at this time. 




The patient was being started on Flagyl IV piggyback 


We discussed the findings with the patient and they are in agreement with the 

management


Images Reviewed?:  Images reviewed and evaluated by me


Time of 1ST Reevaluation:  17:48


Reevaluation 1ST:  Unchanged


Patient Education/Counseling:  Diagnosis, Treatment, Prognosis


Family Education/Counseling:  No Family Present





Departure 1


Departure


Time of Disposition:  18:49


Impression:  


   Primary Impression:  


   Intractable abdominal pain


   Additional Impression:  


   Acute appendicitis


   Qualified Codes:  K35.80 - Unspecified acute appendicitis


Disposition:  09 ADMITTED AS INPATIENT


Admit to:  Med Surg


Condition:  Fair





Critical Care Note


Critical Care Time?:  No





Stability


Stability form required:  Yes


Unstable for transfer:  ED Physician Assesment (Clinical assesment)





Heart Score


Heart Score:  








Heart Score Response (Comments) Value


 


History N/A 0


 


EKG N/A 0


 


Age N/A 0


 


Risk Factors N/A 0


 


Troponin N/A 0


 


Total  0

















RODGER ZEE MD              Jan 24, 2025 17:42

## 2025-01-24 NOTE — DVHINCON2
Date of service:  Jan 24, 2025





History of Present Illness


74-year-old male with a history of ulcerative colitis complaining of two week 

history of bilateral lower quadrant abdominal pain radiating to his back.  Also 

complaining of some burning urination.  Denies any fevers, chills, nausea or 

vomiting.





Past Medical History


Ulcerative colitis.  Coronary artery disease status post stent.  Parkinson's 

disease.





Past Surgical History


Knee surgery.  Cholecystectomy.





Family History:  


Alcoholism


Atrial fibrillation


  G8 SISTER


Cardiovascular disease


  G8 FATHER


Diabetes mellitus


  G8 MOTHER


  G8 FATHER


Family History


Noncontributory.


Social History


Denies tobacco.  Rare alcohol.  No IV drug use.





Allergies:  


Coded Allergies:  


     Cephalexin (Verified  Allergy, Unknown, RASH, 6/7/19)


     Penicillins (Verified  Allergy, Unknown, RASH, 6/7/19)


Home Meds


Active Scripts


Apixaban Base (ELIQUIS) 5 Mg Tab, 5 MG PO BID for 30 Days, #60 TAB


   Prov:MOARIMA DAY MD         4/22/19


Reported Medications


Aspirin-Caffeine (Anacin 400-32 mg) 1 Tab Tab, 1 TAB PO DAILY, TAB


   6/1/20


Hydrocodone-Acetaminophen (Norco  mg) 1 Tab Tab, 1 TAB PO BID, TAB


   6/1/20


Rosuvastatin Calcium (Crestor) 40 Mg Tab, 1 TAB PO DAILY, #30 TAB 5 Refills


   6/1/20


Losartan Potassium (Losartan Potassium) 100 Mg Tab, 100 MG PO DAILY for 30 Days,

MG


   6/1/20


Pantoprazole Sodium Sesquihydr (Pantoprazole Sodium) 40 Mg Tab, 40 MG PO DAILY, 

TAB


   6/7/19


Metoprolol Succinate (Metoprolol Succinate Er) 50 Mg Tab, 50 MG PO DAILY for 30 

Days, MG


   6/7/19


Colestipol Hcl (Colestipol Hcl) 1 Gm Tab, 1 GM PO DAILYP, TAB


   4/16/19





Vital Signs





                                   Vital Signs








  Date Time  Temp Pulse Resp B/P (MAP) Pulse Ox O2 Delivery O2 Flow Rate FiO2


 


1/24/25 16:49  79 18  99 Room Air* 0 21


 


1/24/25 16:47 98.7   166/87 (113)    





 98.7       








Physical Exam


GEN:  Age-appropriate male in no acute distress.  Alert and oriented x2 to 

person and place.





HEENT:  Normocephalic atraumatic.  Moist mucous membranes.  Anicteric sclerae.





CV: RRR





Respiratory: CTAB





ABD:  Soft.  Very minimal bilateral lower quadrant tenderness to palpation 

without guarding or rebound.  Nondistended.





CT of the abdomen and pelvis:  Mild fat stranding surrounding the appendix 

suggesting acute appendicitis.  No free fluid or free air.





Labs/Diagnostic Data





                                      Labs








Test


 1/24/25


16:08 1/24/25


15:28 Range/Units


 


 


White Blood Count


 9.6 


 


 4.4-10.8


10^3/uL


 


Red Blood Count


 4.51 


 


 4.5-5.90


10^6/uL


 


Hemoglobin 13.0 L  13.5-17.5  g/dL


 


Hematocrit 38.7 L  41.0-53.0  %


 


Mean Corpuscular Volume 85.8   80.0-100.0  fL


 


Mean Corpuscular Hemoglobin 29.0   28.0-32.0  pg


 


Mean Corpuscular Hemoglobin


Concent 33.8 


 


 32.0-36.0  g/dL





 


Red Cell Distribution Width 14.1   11.8-14.3  %


 


Platelet Count


 299 


 


 140-450


10^3/uL


 


Mean Platelet Volume 7.4   6.9-10.8  fL


 


Neutrophils (%) (Auto) 73.6   37.0-80.0  %


 


Lymphocytes (%) (Auto) 11.4   10.0-50.0  %


 


Monocytes (%) (Auto) 9.2   0.0-12.0  %


 


Eosinophils (%) (Auto) 5.0   0.0-7.0  %


 


Basophils (%) (Auto) 0.8   0.0-2.0  %


 


Neutrophils # (Auto)


 7.1 


 


 1.6-8.6  10


^3/uL


 


Lymphocytes # (Auto)


 1.1 


 


 0.4-5.4  10


^3/uL


 


Monocytes # (Auto) 0.9   0-1.3  10 ^3/uL


 


Eosinophils # (Auto) 0.5   0-0.8  10 ^3/uL


 


Basophils # (Auto) 0.1   0-0.2  10 ^3/uL


 


Nucleated Red Blood Cells 0.1    %


 


Prothrombin Time 10.7   9.3-11.8  sec


 


Prothrombin Time INR 1.01   0.9-1.15  


 


Activated Partial


Thromboplast Time 24.3 L


 


 24.5-34.5  SEC





 


Sodium Level 138   136-145  mmol/L


 


Potassium Level 2.9 L  3.5-5.1  mmol/L


 


Chloride Level 97 L    mmol/L


 


Carbon Dioxide Level 34 H  20-31  mmol/L


 


Anion Gap 7   5-15  


 


Blood Urea Nitrogen 27 H  9-23  mg/dL


 


Creatinine


 1.34 H


 


 0.700-1.30


mg/dL


 


Glomerular Filtration Rate


Calc 56 


 


 >90  mL/min





 


BUN/Creatinine Ratio 20.1 H  10.0-20.0  


 


Serum Glucose 96     mg/dL


 


Lactic Acid Level 1.7   0.4-2.0  mmol/L


 


Calcium Level 15.2 *H  8.7-10.4  mg/dL


 


Urine Color  Dark-yellow  Yellow  


 


Urine Clarity  Clear  Clear  


 


Urine pH  5.5  5.0-9.0  


 


Urine Specific Gravity  1.011  1.001-1.035  


 


Urine Protein  Negative  Negative  


 


Urine Ketones  Negative  Negative  


 


Urine Blood  Negative  Negative  /uL


 


Urine Nitrite  1+ H Negative  


 


Urine Bilirubin  1+  Negative  


 


Urine Urobilinogen  3 H Negative  mg/dL


 


Urine Leukocyte Esterase  Negative  Negative  /uL


 


Urine RBC  4  0 - 3  /hpf


 


Urine Microscopic WBC  2  0-3  /HPF


 


Urine Squamous Epithelial


Cells 


 Few 


 <5  /hpf





 


Urine Bacteria  None seen  None Seen  /hpf


 


Urine Glucose  Normal  Normal  mg/dL











Assessment


1. Abdominal pain possibly from ulcerative colitis versus appendicitis although 

this seems less likely.


Plan/Recommendation


1. Continue IV antibiotics.


2. We will reassess tomorrow.


Plan discussed with:  Patient











CELIA GRIMES MD                 Jan 24, 2025 18:42

## 2025-01-24 NOTE — DVHHPRES
History of Present Illness


Resident Creating Document:  [facility] RESIDENT


History of Present Illness


74-year-old male with past medical history of atrial fibrillation s/p ablation 

in 2019, CAD s/p PTCA x3 with stenting of left main, lad and circumflex, 

ulcerative colitis diagnosed in April 2024, bilateral pulmonary embolisms, P

arkinson disease, systolic heart failure, asthma, hypertension, who came in due 

to abdominal pain that has been ongoing for the past 2 weeks.  Per patient's 

wife, he has also been experiencing dysuria for the past 2 days along with some 

confusion and delusions/hallucinations.  They called patient's PCP who 

prescribed Levaquin, patient took 1 dose of Levaquin yesterday on 01/22/2025.  

However, due to continued dysuria, confusion and worsening generalized weakness 

with inability to walk is what prompted this visit to the hospital.  On review 

of systems patient is complaining of fatigue, low-grade fever, chills, cough, 

loss of appetite, palpitations, vomiting, urinary frequency and dysuria.  UA 

showed 1+ nitrite.  Patient was AO x2 at the time of my assessment.  Patient was

noted to have hypercalcemia of 15.2 with repeat measurement of serum calcium 

13.9, PTH 4.8 and vitamin-D 121.4.


Past Medical History


 atrial fibrillation s/p ablation in 2019, CAD s/p PTCA x3 with stenting of left

main, lad and circumflex, ulcerative colitis diagnosed in April 2024, bilateral 

pulmonary embolisms, Parkinson disease, systolic heart failure, asthma, 

hypertension


Past Surgical History


Cholecystectomy, left knee replacement


Past Social History


Smoking:  Denies 


Alcohol:  Remote history of binge alcohol drinking, quit 10 years ago.  


Drugs:  Denies, however, occasionally uses THC/CBD gummies





Review of Systems


Constitutional:  Yes: Fever, Chills, Weakness, Malaise; No: Sweats, Other


Eyes:  No: Pain, Vision change, Conjunctivae inflammation, Eyelid inflammation, 

Other, Redness


ENT:  Nose congestion, Throat pain; No: Ear pain, Ear discharge, Nose pain, Nose

discharge, Mouth pain, Mouth swelling, Throat swelling, Other


Respiratory:  Cough; No: Dry, Shortness of breath, SOB with excertion, Wheezing,

Hemoptysis, Pleuritic Pain, Sputum, Wheezing, Other


Cardiovascular:  Palpitations; No: Chest Pain, Orthopnea, Paroxysmal Noc. 

Dyspnea, Edema, Lt Headedness, Other


Gastrointestinal:  Nausea, Vomiting, Abdominal Pain; No: Diarrhea, Constipation,

Melena, Hematochezia, Other


Genitourinary:  Dysuria, Frequency; No Incontinence, No Hematuria, No Retention,

No Other


Musculoskeletal:  No: other, neck pain, shoulder pain, arm pain, back pain, hand

pain, leg pain, foot pain


Skin:  No: Rash, Lesions, Jaundice, Bruising, Other


Neurological:  Confusion; No: Weakness, Numbness, Incoordination, Change in 

speech, Seizures, Other


Allergies:  


Coded Allergies:  


     Cephalexin (Verified  Allergy, Unknown, RASH, 6/7/19)


     Penicillins (Verified  Allergy, Unknown, RASH, 6/7/19)


Medications





                               Current Medications








 Medications  Dose


 Ordered  Sig/Carmen


 Route  Start Time


 Stop Time Status Last Admin


Dose Admin


 


 Metronidazole  100 ml @ 


 100 mls/hr  Q8HR


 IV  1/24/25 22:00


     





 


 Levofloxacin  500 mg  DAILY


 PO  1/25/25 10:00


     














Exam


Vital Signs





Vital Signs








  Date Time  Temp Pulse Resp B/P (MAP) Pulse Ox O2 Delivery O2 Flow Rate FiO2


 


1/24/25 21:18 97.5 81  181/90 (120) 95   





 97.5       


 


1/24/25 16:49   18   Room Air* 0 21








General Appearance:  Alert, Cooperative, No acute distress


HEENT:  Atraumatic, PERRLA, EOMI, Mucous membr. moist/pink


Respiratory:  Clear to auscultation, Normal air movement


Cardiovascular:  Regular rate, Normal S1, Normal S2


Abdominal:  Normal bowel sounds, Soft, Other (Bilateral flank pain, tenderness 

to palpation)


Extremities:  No clubbing, Other (Trace lower extremity edema)


Skin:  No significant lesion


Neuro:  Normal speech, Other (Bilateral upper extremity resting pill-rolling 

tremors noted)


Psych/Mental Status:  Mental status NL, Mood NL





Labs/Xrays





                                      Labs








Test


 1/24/25


16:08 1/24/25


15:28 Range/Units


 


 


White Blood Count


 9.6 


 


 4.4-10.8


10^3/uL


 


Red Blood Count


 4.51 


 


 4.5-5.90


10^6/uL


 


Hemoglobin 13.0 L  13.5-17.5  g/dL


 


Hematocrit 38.7 L  41.0-53.0  %


 


Mean Corpuscular Volume 85.8   80.0-100.0  fL


 


Mean Corpuscular Hemoglobin 29.0   28.0-32.0  pg


 


Mean Corpuscular Hemoglobin


Concent 33.8 


 


 32.0-36.0  g/dL





 


Red Cell Distribution Width 14.1   11.8-14.3  %


 


Platelet Count


 299 


 


 140-450


10^3/uL


 


Mean Platelet Volume 7.4   6.9-10.8  fL


 


Neutrophils (%) (Auto) 73.6   37.0-80.0  %


 


Lymphocytes (%) (Auto) 11.4   10.0-50.0  %


 


Monocytes (%) (Auto) 9.2   0.0-12.0  %


 


Eosinophils (%) (Auto) 5.0   0.0-7.0  %


 


Basophils (%) (Auto) 0.8   0.0-2.0  %


 


Neutrophils # (Auto)


 7.1 


 


 1.6-8.6  10


^3/uL


 


Lymphocytes # (Auto)


 1.1 


 


 0.4-5.4  10


^3/uL


 


Monocytes # (Auto) 0.9   0-1.3  10 ^3/uL


 


Eosinophils # (Auto) 0.5   0-0.8  10 ^3/uL


 


Basophils # (Auto) 0.1   0-0.2  10 ^3/uL


 


Nucleated Red Blood Cells 0.1    %


 


Prothrombin Time 10.7   9.3-11.8  sec


 


Prothrombin Time INR 1.01   0.9-1.15  


 


Activated Partial


Thromboplast Time 24.3 L


 


 24.5-34.5  SEC





 


Sodium Level 138   136-145  mmol/L


 


Potassium Level 2.9 L  3.5-5.1  mmol/L


 


Chloride Level 97 L    mmol/L


 


Carbon Dioxide Level 34 H  20-31  mmol/L


 


Anion Gap 7   5-15  


 


Blood Urea Nitrogen 27 H  9-23  mg/dL


 


Creatinine


 1.34 H


 


 0.700-1.30


mg/dL


 


Glomerular Filtration Rate


Calc 56 


 


 >90  mL/min





 


BUN/Creatinine Ratio 20.1 H  10.0-20.0  


 


Serum Glucose 96     mg/dL


 


Lactic Acid Level 1.7   0.4-2.0  mmol/L


 


Calcium Level 15.2 *H  8.7-10.4  mg/dL


 


Urine Color  Dark-yellow  Yellow  


 


Urine Clarity  Clear  Clear  


 


Urine pH  5.5  5.0-9.0  


 


Urine Specific Gravity  1.011  1.001-1.035  


 


Urine Protein  Negative  Negative  


 


Urine Ketones  Negative  Negative  


 


Urine Blood  Negative  Negative  /uL


 


Urine Nitrite  1+ H Negative  


 


Urine Bilirubin  1+  Negative  


 


Urine Urobilinogen  3 H Negative  mg/dL


 


Urine Leukocyte Esterase  Negative  Negative  /uL


 


Urine RBC  4  0 - 3  /hpf


 


Urine Microscopic WBC  2  0-3  /HPF


 


Urine Squamous Epithelial


Cells 


 Few 


 <5  /hpf





 


Urine Bacteria  None seen  None Seen  /hpf


 


Urine Glucose  Normal  Normal  mg/dL











Assessment/Plan


Assessment/Plan


Subacute/chronic hypercalcemia with minimal symptoms likely due to hype

rvitaminosis D vs malignancy vs granulomatous disease?


- IV NS 1 L followed by IV NS at 100 cc/hour


- serum calcium 15.2 --> 13.9


- PTH 4.8, vitamin-D 121.4


- CXR: Clear lungs


- ordered CT chest abdomen pelvis with IV contrast


- ordered PSA


- EKG q.4 hours





Possible acute appendicitis


History of ulcerative colitis, currently not on any treatment


- CT abdomen pelvis:  Mild fat stranding surrounding the appendix suggestive of 

acute appendicitis, there is no periappendiceal fluid collection or free air.  

Prostatomegaly.


- IV metronidazole, levofloxacin p.o.


- surgery consult appreciated 


- patient NPO





Coronary artery disease s/p PTCA x3


Possible history of systolic heart failure?


Hypertension


- resumed home medication ticagrelor 90 mg p.o. daily


- resumed home medication losartan 100 mg p.o. daily 


- resumed home medication metoprolol 50 mg


- echo from 2020:  Left atrial enlargement.  EF 60%.


- BNP 35.27 


- ordered repeat echocardiogram





Hypokalemia 


- IV potassium 60 mEq


- IV magnesium 3 g





PABLO likely hemodynamically mediated on questionable CKD 


- IV NS 1 L bolus 


- IV NS maintenance at 100 cc/hour





History of Parkinson's disease 


- resumed home medication amantadine 100 mg p.o. daily





PUD prophylaxis: protonix 40mg


Goals of care:  Full code, discussed for >16 minutes on 01/24/2025


Plan discussed with patient


Plan discussed with Dr. Lawton


Plan discussed with:  Patient, Spouse, Other (RN)





Date of Service:  Jan 24, 2025


Billing Provider:  HUMPHREY LAWTON MD


Common Visit Codes:  99223-INITIAL  INP/OBS CARE (HIGH)


Secondary Visit Codes:  99497-ADVANCED CARE PLAN 30 MINUTES











[facility]             Jan 24, 2025 21:50


HUMPHREY LAWTON MD            Jan 26, 2025 00:24

## 2025-01-24 NOTE — DVH
CT ABDOMEN AND PELVIS WITHOUT CONTRAST



CLINICAL HISTORY: pain



TECHNIQUE: Multiple contiguous axial images of the abdomen and pelvis without intravenous contrast.  
The images were reformatted degenerate coronal and sagittal reconstructions.



All CT scans at this medical facility are performed using dose modulation techniques as appropriate t
o a performed exam including the following:Automated exposure control was utilized; adjustment of the
 MA and/or KV according to patient size; and use of iterative reconstruction technique.



Radiation Dose Information:



CT Dose: CTDI volume is 8 mGy. Dose-length product is 355 mGy*cm



Comparison: None



FINDINGS:  



Evaluation of the abdomen and pelvis is limited without intravenous contrast.



The gallbladder is surgically absent. The  liver,   pancreas, kidneys, adrenal glands, and spleen denisse
ear within normal limits.



There is no gross evidence of abdominal lymphadenopathy. There is no free fluid or free air.



The stomach grossly appears unremarkable.  The small and large bowel loops demonstrate normal caliber
.  There is mild fat stranding surrounding the appendix suggesting acute appendicitis. There is no pe
riappendiceal fluid collection or free air.



The abdominal aorta and IVC appear within normal limits.



The bladder appears unremarkable for the degree of distention. The prostate gland is prominent in siz
e..  There is no gross evidence of a pelvic mass. There is no free fluid collection. There are small 
bilateral fat containing inguinal hernias.



Lung bases are clear.



There is no acute osseous abnormality.



IMPRESSION: 



1. There is mild fat stranding surrounding the appendix suggestive of acute appendicitis. There is no
 periappendiceal fluid collection or free air.



2. Prostatomegaly.



The results were discussed with the clinical service in the emergency room at [facility] Inna Castanon on 01/24/2025, 4:38 p.m..



HS:Y



Electronically Signed by: Amilcar Castanon at 01/24/2025 16:38:32 PM

## 2025-01-24 NOTE — DVH
CHEST RADIOGRAPH



Indication: cough



Technique: Single frontal view of the chest was obtained



Comparison: None



FINDINGS: 



Lines and Tubes: None



Lungs: Clear



Pleura: No effusion.



No pneumothorax. 



Cardiomediastinal contours: Unremarkable



Bones: Unremarkable



IMPRESSION: 



Clear lungs. 



Electronically Signed by: Francheska Evans at 01/24/2025 22:38:31 PM

## 2025-01-25 VITALS
DIASTOLIC BLOOD PRESSURE: 98 MMHG | RESPIRATION RATE: 18 BRPM | OXYGEN SATURATION: 97 % | TEMPERATURE: 97.3 F | SYSTOLIC BLOOD PRESSURE: 178 MMHG | HEART RATE: 72 BPM

## 2025-01-25 VITALS
RESPIRATION RATE: 20 BRPM | OXYGEN SATURATION: 97 % | SYSTOLIC BLOOD PRESSURE: 167 MMHG | DIASTOLIC BLOOD PRESSURE: 93 MMHG | HEART RATE: 68 BPM | TEMPERATURE: 97.4 F

## 2025-01-25 VITALS
DIASTOLIC BLOOD PRESSURE: 96 MMHG | HEART RATE: 70 BPM | RESPIRATION RATE: 19 BRPM | SYSTOLIC BLOOD PRESSURE: 176 MMHG | OXYGEN SATURATION: 96 % | TEMPERATURE: 98 F

## 2025-01-25 VITALS — RESPIRATION RATE: 20 BRPM | OXYGEN SATURATION: 97 % | HEART RATE: 70 BPM

## 2025-01-25 VITALS
SYSTOLIC BLOOD PRESSURE: 156 MMHG | RESPIRATION RATE: 18 BRPM | DIASTOLIC BLOOD PRESSURE: 82 MMHG | HEART RATE: 68 BPM | TEMPERATURE: 97.7 F | OXYGEN SATURATION: 94 %

## 2025-01-25 VITALS — RESPIRATION RATE: 18 BRPM | HEART RATE: 68 BPM | OXYGEN SATURATION: 94 %

## 2025-01-25 VITALS
OXYGEN SATURATION: 97 % | DIASTOLIC BLOOD PRESSURE: 88 MMHG | SYSTOLIC BLOOD PRESSURE: 152 MMHG | TEMPERATURE: 97.4 F | RESPIRATION RATE: 19 BRPM | HEART RATE: 65 BPM

## 2025-01-25 VITALS
TEMPERATURE: 98.7 F | DIASTOLIC BLOOD PRESSURE: 95 MMHG | RESPIRATION RATE: 18 BRPM | HEART RATE: 77 BPM | OXYGEN SATURATION: 95 % | SYSTOLIC BLOOD PRESSURE: 189 MMHG

## 2025-01-25 LAB
ALBUMIN SERPL-MCNC: 3.8 G/DL (ref 3.2–4.8)
ALP SERPL-CCNC: 200 U/L (ref 46–116)
ALT SERPL-CCNC: 36 U/L (ref 7–40)
ANION GAP SERPL CALCULATED.3IONS-SCNC: 7 MMOL/L (ref 5–15)
BILIRUB SERPL-MCNC: 0.7 MG/DL (ref 0.2–1)
BUN SERPL-MCNC: 28 MG/DL (ref 9–23)
BUN/CREAT SERPL: 21.7 (ref 10–20)
CALCIUM SERPL-MCNC: 13.2 MG/DL (ref 8.7–10.4)
CHLORIDE SERPL-SCNC: 101 MMOL/L (ref 98–107)
CO2 SERPL-SCNC: 33 MMOL/L (ref 20–31)
GLUCOSE SERPL-MCNC: 105 MG/DL (ref 74–106)
HCT VFR BLD AUTO: 33.3 % (ref 41–53)
HGB BLD-MCNC: 11.4 G/DL (ref 13.5–17.5)
MCH RBC QN AUTO: 29.1 PG (ref 28–32)
MCV RBC AUTO: 85 FL (ref 80–100)
NRBC BLD QL AUTO: 0 %
POTASSIUM SERPL-SCNC: 3.1 MMOL/L (ref 3.5–5.1)
PROT SERPL-MCNC: 6.1 G/DL (ref 5.7–8.2)
SODIUM SERPL-SCNC: 141 MMOL/L (ref 136–145)

## 2025-01-25 RX ADMIN — POTASSIUM CHLORIDE SCH MLS/HR: 200 INJECTION, SOLUTION INTRAVENOUS at 07:30

## 2025-01-25 RX ADMIN — MAGNESIUM SULFATE IN DEXTROSE SCH MLS/HR: 10 INJECTION, SOLUTION INTRAVENOUS at 05:56

## 2025-01-25 RX ADMIN — MAGNESIUM SULFATE IN DEXTROSE SCH MLS/HR: 10 INJECTION, SOLUTION INTRAVENOUS at 03:30

## 2025-01-25 RX ADMIN — HYDRALAZINE HYDROCHLORIDE ONE MG: 20 INJECTION INTRAMUSCULAR; INTRAVENOUS at 08:38

## 2025-01-25 RX ADMIN — POTASSIUM CHLORIDE ONE MLS/HR: 200 INJECTION, SOLUTION INTRAVENOUS at 10:27

## 2025-01-25 RX ADMIN — PANTOPRAZOLE SODIUM SCH MG: 40 INJECTION, POWDER, FOR SOLUTION INTRAVENOUS at 10:27

## 2025-01-25 RX ADMIN — ACETAMINOPHEN PRN MG: 325 TABLET ORAL at 15:45

## 2025-01-25 RX ADMIN — AMANTADINE HYDROCHLORIDE SCH MG: 100 CAPSULE ORAL at 12:22

## 2025-01-25 RX ADMIN — LOSARTAN POTASSIUM SCH MG: 50 TABLET, FILM COATED ORAL at 05:38

## 2025-01-25 RX ADMIN — TICAGRELOR SCH MG: 90 TABLET ORAL at 10:29

## 2025-01-25 NOTE — DVHINCON2
Date of service:  Jan 25, 2025


Referring Physician


Dr. Fernandez





Reason for Consultation


Acute kidney injury





History of Present Illness


Patient is a 74-year-old male with past medical history significant for HTN, MI,

Congestive heart failure, CAD status post PCI, ulcerative colitis and Parkinson 

disease is admitted for abdominal pain on admission patient found to have 

elevated creatinine and calcium nephrology is consulted for acute kidney injury





Past Medical History


PAST MEDICAL HISTORY:  HTN, MI, Parkinson's, chronic ulcerative colitis, 

Congestive heart failure, AFib





Past Surgical History


Surgical History:  Cholecystectomy, PTCA, Tonsillectomy, Left knee surgery





Allergies:  


Coded Allergies:  


     Cephalexin (Verified  Allergy, Unknown, RASH, 6/7/19)


     Penicillins (Verified  Allergy, Unknown, RASH, 6/7/19)


Home Meds


Reported Medications


Docusate Sodium (Dulcolax Stool Softener) 100 Mg Cap, 100 MG PO BID, CAP


   1/25/25


Ticagrelor Base (BRILINTA) 90 Mg Tab, 1 TAB PO DAILY


   1/25/25


Amantadine HCl (Amantadine HCl) 100 Mg Cap, 1 TAB PO DAILY


   1/25/25


Aspirin-Caffeine (Anacin 400-32 mg) 1 Tab Tab, 1 TAB PO DAILY, TAB


   6/1/20


Hydrocodone-Acetaminophen (Norco  mg) 1 Tab Tab, 1 TAB PO BID, TAB


   6/1/20


Rosuvastatin Calcium (Crestor) 40 Mg Tab, 1 TAB PO DAILY, #30 TAB 5 Refills


   6/1/20


Losartan Potassium (Losartan Potassium) 100 Mg Tab, 100 MG PO DAILY for 30 Days,

MG


   6/1/20


Pantoprazole Sodium Sesquihydr (Pantoprazole Sodium) 40 Mg Tab, 40 MG PO DAILY, 

TAB


   6/7/19


Metoprolol Succinate (Metoprolol Succinate Er) 50 Mg Tab, 50 MG PO DAILY for 30 

Days, MG


   6/7/19


Colestipol Hcl (Colestipol Hcl) 1 Gm Tab, 1 GM PO DAILYP, TAB


   4/16/19


Current Medications





                               Current Medications








 Medications


  (Trade)  Dose


 Ordered  Sig/Carmen


 Route


 PRN Reason  Start Time


 Stop Time Status Last Admin


 


 Levofloxacin/


 Dextrose  100 ml @ 


 100 mls/hr  DAILY


 IV


   1/25/25 10:00


 1/24/25 18:55 DC  





 


 Metronidazole  100 ml @ 


 100 mls/hr  Q8HR


 IV


   1/24/25 22:00


    1/25/25 06:39





 


 Levofloxacin


  (Levaquin Tablet)  500 mg  DAILY


 PO


   1/25/25 10:00


     





 


 Acetaminophen


  (Tylenol Tablet)  650 mg  Q6HP  PRN


 PO


 PAIN SCALE 1-3  OR TEMP>100.4  1/24/25 22:00


     





 


 Potassium Chloride  100 ml @ 


 50 mls/hr  Q2H


 IV


   1/24/25 22:00


 1/25/25 01:59 DC 1/25/25 05:18





 


 Metoprolol


 Succinate


  (Toprol Xl)  50 mg  DAILY


 PO


   1/24/25 22:00


    1/24/25 22:16





 


 Pantoprazole


 Sodium


  (Protonix)  40 mg  DAILY


 IV


   1/25/25 10:00


     





 


 Sodium Chloride  1,000 ml @ 


 100 mls/hr  Q10H


 IV


   1/24/25 22:15


    1/24/25 22:15





 


 Magnesium Sulfate/


 Dextrose  100 ml @ 


 100 mls/hr  Q1HR


 IV


   1/25/25 00:00


 1/25/25 02:59 DC 1/25/25 05:18





 


 Losartan Potassium


  (Cozaar Tablet)  100 mg  DAILY


 PO


   1/25/25 04:45


     





 


 Amantadine HCl


  (Symmetrel


 Capsule)  100 mg  DAILY


 PO


   1/25/25 10:00


     





 


 Ticagrelor


  (Brilinta)  90 mg  DAILY


 PO


   1/25/25 10:00


     





 


 Magnesium Sulfate/


 Dextrose  100 ml @ 


 100 mls/hr  Q1HR


 IV


   1/25/25 06:00


 1/25/25 06:59 DC  





 


 Potassium Chloride  100 ml @ 


 50 mls/hr  Q2H


 IV


   1/25/25 07:30


 1/25/25 11:29   














Family History:  


Alcoholism


Atrial fibrillation


  G8 SISTER


Cardiovascular disease


  G8 FATHER


Diabetes mellitus


  G8 MOTHER


  G8 FATHER





Review of Systems


All 12 item review of systems reviewed with the patient nonsignificant except 

what is mentioned in the history of present illness





H&P Exam


Vital Signs/I&O





                                   Vital Sign








  Date Time  Temp Pulse Resp B/P (MAP) Pulse Ox O2 Delivery O2 Flow Rate FiO2


 


1/25/25 08:38    189/95    


 


1/25/25 05:19 97.4 65 19  97   





 97.4       


 


1/25/25 00:30      Room Air* 0 21














                               Intake and Output  


 


 1/24/25 1/25/25





 19:00 07:00


 


Intake Total  0 ml


 


Output Total  400 ml


 


Balance  -400 ml


 


  


 


Intake Oral  0 ml


 


Output Urine Total  400 ml








Physical Exam


Patient lying comfortably in bed no acute distress


Lungs clear to auscultation bilaterally


Cardiac exam regular rate and rhythm


GI soft nontender


 was normal


Extremity no clubbing cyanosis or edema


Neuro nonfocal





Labs/Diagnostic Data


Labs/Diagnostic Data





                                Laboratory Tests








Test


 1/25/25


05:02 1/24/25


22:04 1/24/25


16:08 1/24/25


15:28 Range/Units


 


 


White Blood Count


 8.5 


 


 9.6 


 


 4.4-10.8


10^3/uL


 


Red Blood Count


 3.91 L


 


 4.51 


 


 4.5-5.90


10^6/uL


 


Hemoglobin 11.4 L  13.0 L  13.5-17.5  g/dL


 


Hematocrit 33.3 #L  38.7 L  41.0-53.0  %


 


Mean Corpuscular Volume 85.0   85.8   80.0-100.0  fL


 


Mean Corpuscular Hemoglobin 29.1   29.0   28.0-32.0  pg


 


Mean Corpuscular Hemoglobin


Concent 34.3 


 


 33.8 


 


 32.0-36.0  g/dL





 


Red Cell Distribution Width 14.7 H  14.1   11.8-14.3  %


 


Platelet Count


 255 


 


 299 


 


 140-450


10^3/uL


 


Mean Platelet Volume 7.5   7.4   6.9-10.8  fL


 


Neutrophils (%) (Auto) 71.8   73.6   37.0-80.0  %


 


Lymphocytes (%) (Auto) 11.5   11.4   10.0-50.0  %


 


Monocytes (%) (Auto) 9.4   9.2   0.0-12.0  %


 


Eosinophils (%) (Auto) 6.2   5.0   0.0-7.0  %


 


Basophils (%) (Auto) 1.1   0.8   0.0-2.0  %


 


Neutrophils # (Auto)


 6.1 


 


 7.1 


 


 1.6-8.6  10


^3/uL


 


Lymphocytes # (Auto)


 1.0 


 


 1.1 


 


 0.4-5.4  10


^3/uL


 


Monocytes # (Auto) 0.8   0.9   0-1.3  10 ^3/uL


 


Eosinophils # (Auto) 0.5   0.5   0-0.8  10 ^3/uL


 


Basophils # (Auto) 0.1   0.1   0-0.2  10 ^3/uL


 


Nucleated Red Blood Cells 0.0   0.1    %


 


Sodium Level 141  140  138   136-145  mmol/L


 


Potassium Level 3.1 L 2.9 L 2.9 L  3.5-5.1  mmol/L


 


Chloride Level 101  101  97 L    mmol/L


 


Carbon Dioxide Level 33 H 32 H 34 H  20-31  mmol/L


 


Anion Gap 7  7  7   5-15  


 


Blood Urea Nitrogen 28 H 28 H 27 H  9-23  mg/dL


 


Creatinine


 1.29 


 1.21 


 1.34 H


 


 0.700-1.30


mg/dL


 


Glomerular Filtration Rate


Calc 59 


 63 


 56 


 


 >90  mL/min





 


BUN/Creatinine Ratio 21.7 H 23.1 H 20.1 H  10.0-20.0  


 


Serum Glucose 105  95  96     mg/dL


 


Calcium Level 13.2 *H 13.9 *H 15.2 *H  8.7-10.4  mg/dL


 


Magnesium Level 2.4  1.9    1.6-2.6  mg/dL


 


Total Bilirubin 0.7     0.2-1.0  mg/dL


 


Aspartate Amino Transferase


(AST) 26 


 


 


 


 13-40  U/L





 


Alanine Aminotransferase (ALT) 36     7-40  U/L


 


Alkaline Phosphatase 200 H      U/L


 


Total Protein 6.0     5.7-8.2  g/dL


 


Albumin 3.8  4.0    3.2-4.8  g/dL


 


Phosphorus Level  3.5    2.4-5.1  mg/dL


 


B-Type Natriuretic Peptide  35.27    0-100  pg/mL


 


Vitamin D 25-Hydroxy  121.4 H   30.0-100  ng/mL


 


Thyroid Stimulating Hormone


(TSH) 


 2.39 


 


 


 0.55-4.78


uIU/mL


 


Parathyroid Hormone (Intact)


 


 4.8 L


 


 


 18.4-80.1


pg/mL


 


Prothrombin Time   10.7   9.3-11.8  sec


 


Prothrombin Time INR   1.01   0.9-1.15  


 


Activated Partial


Thromboplast Time 


 


 24.3 L


 


 24.5-34.5  SEC





 


Lactic Acid Level   1.7   0.4-2.0  mmol/L


 


Urine Color    Dark-yellow  Yellow  


 


Urine Clarity    Clear  Clear  


 


Urine pH    5.5  5.0-9.0  


 


Urine Specific Gravity    1.011  1.001-1.035  


 


Urine Protein    Negative  Negative  


 


Urine Ketones    Negative  Negative  


 


Urine Blood    Negative  Negative  /uL


 


Urine Nitrite    1+ H Negative  


 


Urine Bilirubin    1+  Negative  


 


Urine Urobilinogen    3 H Negative  mg/dL


 


Urine Leukocyte Esterase    Negative  Negative  /uL


 


Urine RBC    4  0 - 3  /hpf


 


Urine Microscopic WBC    2  0-3  /HPF


 


Urine Squamous Epithelial


Cells 


 


 


 Few 


 <5  /hpf





 


Urine Bacteria    None seen  None Seen  /hpf


 


Urine Glucose    Normal  Normal  mg/dL











Assessment


Acute kidney injury secondary hemodynamic mediated


Hypercalcemia rule out malignancy


Chronic diastolic heart failure


AFib


Hypertension


Parkinson's disease


Hypokalemia





Recommendations


Closely monitor fluid and electrolytes


Avoid nephrotoxic medications


Strict I&Os


Check urine electrolytes


Check kidney ultrasound


KCL replacement


Check Check 25 hydroxyvitamin D and PTH


Consider bisphosphonate IV


Malignancy workup per primary team


We will continue to follow








Patient seen and examined by myself.  I discussed my plan of care with the 

patient and primary nurse at the bedside 


I would like to thank Dr. Fernandez for the consult, will follow


Plan discussed with:  Patient











RANJAN SAENZ MD        Jan 25, 2025 09:21

## 2025-01-25 NOTE — DVHPNRES
Progress Note


Date Seen:  Jan 25, 2025


Resident Creating Document:  ISABELLA DERAS RESIDENT


Medical Necessity Reason


Pt with a Central, PICC or Fol:  No





Subjective


Review of Systems





This is a 74-year-old male with past medical history of atrial fibrillation s/p 

ablation in 2019, CAD s/p PTCA x3 with stenting of left main, lad and 

circumflex, ulcerative colitis diagnosed in April 2024, bilateral pulmonary 

embolisms, Parkinson disease, systolic heart failure, asthma, hypertension, who 

came in due to abdominal pain that has been ongoing for the past 2 weeks.  Per 

patient's wife, he has also been experiencing dysuria for the past 2 days along 

with some confusion and delusions/hallucinations.  They called patient's PCP who

prescribed Levaquin, patient took 1 dose of Levaquin yesterday on 01/22/2025.  

However, due to continued dysuria, confusion and worsening generalized weakness 

with inability to walk is what prompted this visit to the hospital. On admission

patient was noted to have hypercalcemia of 15.2 and subsequent measurement 

became 13.9>13.2, PTH 4.8 and vitamin D 121.4.





 Patient was seen and examined on the bedside. He is alert, oriented x3.  

Complains of right lower quadrant pain, frequency and urgency and no other 

active complaint.





 Constitutional:  No: Fever, Chills, Sweats, Weakness, Malaise, Other


Eyes:  No: Pain, Vision change, Conjunctivae inflammation, Eyelid inflammation, 

Other, Redness


ENT:  No: Ear pain, Ear discharge, Nose pain, Nose discharge, Nose congestion, 

Mouth pain, Mouth swelling, Throat pain, Throat swelling, Other


Respiratory:  Shortness of breath, improving No: Cough, Dry,Wheezing, 

Hemoptysis, Pleuritic Pain, Sputum, Wheezing, Other


Cardiovascular:  No: Chest Pain, Palpitations, Orthopnea, Paroxysmal Noc. 

Dyspnea, Edema, Lt Headedness, Other


Gastrointestinal:  No: Nausea, Vomiting, Abdominal Pain, Diarrhea, Constipation,

Melena, Hematochezia, Other


Musculoskeletal:  No: other, neck pain, shoulder pain, arm pain, back pain, hand

pain, leg pain, foot pain


Neurological:; No: Weakness, Numbness, Incoordination, Change in speech, 

Confusion, Seizures





Objective


vital signs





                                   Vital Sign








  Date Time  Temp Pulse Resp B/P (MAP) Pulse Ox O2 Delivery O2 Flow Rate FiO2


 


1/25/25 10:28  65  189/95    


 


1/25/25 05:19 97.4  19  97   





 97.4       


 


1/25/25 00:30      Room Air* 0 21














                           Total Intake and Output   


 


 1/24/25 1/24/25 1/25/25





 15:00 23:00 07:00


 


Intake Total   0 ml


 


Output Total   400 ml


 


Balance   -400 ml








medications





                               Current Medications








 Medications  Dose


 Ordered  Sig/Carmen


 Route  Start Time


 Stop Time Status Last Admin


Dose Admin


 


 Metronidazole  100 ml @ 


 100 mls/hr  Q8HR


 IV  1/24/25 22:00


    1/25/25 06:39


100 MLS/HR


 


 Levofloxacin  500 mg  DAILY


 PO  1/25/25 10:00


    1/25/25 10:29


500 MG


 


 Acetaminophen  650 mg  Q6HP  PRN


 PO  1/24/25 22:00


     





 


 Metoprolol


 Succinate  50 mg  DAILY


 PO  1/24/25 22:00


    1/25/25 10:28


50 MG


 


 Pantoprazole


 Sodium  40 mg  DAILY


 IV  1/25/25 10:00


    1/25/25 10:27


40 MG


 


 Sodium Chloride  1,000 ml @ 


 100 mls/hr  Q10H


 IV  1/24/25 22:15


    1/24/25 22:15


100 MLS/HR


 


 Losartan Potassium  100 mg  DAILY


 PO  1/25/25 04:45


     





 


 Amantadine HCl  100 mg  DAILY


 PO  1/25/25 10:00


     





 


 Ticagrelor  90 mg  DAILY


 PO  1/25/25 10:00


    1/25/25 10:29


90 MG


 


 Potassium Chloride  100 ml @ 


 50 mls/hr  Q2H


 IV  1/25/25 07:30


 1/25/25 11:29   











Examination








Physical examination:








General Appearance:  Alert, Oriented X3, Cooperative, No acute distress, 

confused


HEENT:  Atraumatic, PERRLA, EOMI, Mucous membrane moist/pink


Respiratory:  Clear to auscultation, Normal air movement


Cardiovascular:  Regular rate, Normal S1, Normal S2, No murmurs, no chest wall 

tenderness


Abdominal: rebound tenderness present in the rt lower quadrant,  Normal bowel 

sounds, Soft, No hepatosplenomegaly, No masses


Extremities:  No clubbing, No cyanosis, No edema, Normal pulses, No 

tenderness/swelling


Skin:  No rashes, No breakdown, No significant lesion


Neuro: Resting pill rolling tremor in bilateral hands, shuffling gait, Normal 

speech, Strength at 5/5 X4 ext, Normal tone, Sensation intact, grossly intact 

cranial nerves.  Confused


Psych/Mental Status:  Confused


laboratory and microbiology


                                Laboratory Tests


1/25/25 05:02

















Test


 1/25/25


05:02 Range/Units


 


 


Serum Glucose 105    mg/dL








Labs and/or images reviewed:  Labs reviewed by me, Image(s) reviewed by me





Problem List/Assessment/Plan


Problem List/Assessment/Plan


Assessment/Plan:


# Subacute/chronic hypercalcemia with minimal symptoms likely due to 

hypervitaminosis D/ granulomatous disease


# Rule out malignancy


- IV NS at 100 ml/hr


- serum calcium 15.2 > 13.9>13.2


- PTH 4.8, vitamin-D 121.4


- CXR: Clear lungs


- CT abdomen pelvis with contrast revealed minimal fat stranding around the 

appendix.


- ordered PSA


- EKG q.4 hours





# Possible acute appendicitis


# History of ulcerative colitis, currently not on any treatment


- CT abdomen pelvis: Mild fat stranding surrounding the appendix suggestive of 

acute appendicitis, there is no periappendiceal fluid collection or free air.  

Prostatomegaly.


- IV metronidazole 500 mg q8hr and levofloxacin 500 mg po daily.


- Surgery recommended conservative management and advanced to clear liquid diet.





# Coronary artery disease s/p PTCA x3


# Hypertension heart disease


- Continue Brilinta 90 mg p.o. daily, losartan 100 mg daily and metoprolol 

succinate 50 mg daily.


- echo from 2020:  Left atrial enlargement.  EF 60%.


- BNP 35.27 


- Ordered repeat echo





# Hypokalemia 


- Replenished


- Ordered repeat potassium





# PABLO likely secondary to hemodynamically mediated/VMN 


- IV NS 1 L bolus 


- IV NS maintenance at 100 cc/hour


- Avoid nephrotoxic agents


- Monitor BMP





# History of Parkinson's disease 


- Continue amantadine 100 mg p.o. daily





PUD prophylaxis: Protonix 40mg


Goals of care:  Full code, discussed for 20 minutes with the patient's wife





Plan discussed with Dr. Eastman


Plan discussed with:  Patient (As much as he understood), Spouse, Other (RN)





My Orders


My Orders





                        Orders - ISABELLA DERAS RESIDENT








Procedure Category Date Status





  Time 


 


Potassium Chl PHA 1/25/25 In Process





20meq/100ml  07:30 











Addendum


I was physically present for the key portions of the service provided to patient

by THE RESIDENT. I have reviewed the documentation, discussed the case with 

resident and agree with the resident's documentation except as noted.  Also the 

patient's clinical case was discussed with the patient's nurse.








This medical document was created using an electronic medical record system with

computerized dictation system.  Although this document has been carefully 

reviewed, there might still be some phonetic and typographical errors.  These 

areas are purely typographical due to imperfections of the software programs, 

and do not reflect any compromise in the patient's medical care.





Late signature.





Date of Service:  Jan 25, 2025


Billing Provider:  KEON EASTMAN MD


Common Visit Codes:  95556-[id number] INP/OBS CARE(HIGH)


Secondary Visit Codes:  99497-ADVANCED CARE PLAN 30 MINUTES (20 minutes)











ISABELLA DERAS RESIDENT        Jan 25, 2025 10:30


KEON EASTMAN MD             Jan 26, 2025 18:50

## 2025-01-25 NOTE — DVHPN2
Progress Note - Dictate


Date Seen:  Jan 25, 2025


Medical Necessity Reason


Pt with a Central, PICC or Fol:  No


Subjective


E:  no major events o/n.





no complaints.  denies RLQ pain.


vital signs





                                   Vital Sign








  Date Time  Temp Pulse Resp B/P (MAP) Pulse Ox O2 Delivery O2 Flow Rate FiO2


 


1/25/25 10:28  65  189/95    


 


1/25/25 05:19 97.4  19  97   





 97.4       


 


1/25/25 00:30      Room Air* 0 21














                           Total Intake and Output   


 


 1/24/25 1/24/25 1/25/25





 15:00 23:00 07:00


 


Intake Total   0 ml


 


Output Total   400 ml


 


Balance   -400 ml








medications





                               Current Medications








 Medications  Dose


 Ordered  Sig/Carmen


 Route  Start Time


 Stop Time Status Last Admin


Dose Admin


 


 Metronidazole  100 ml @ 


 100 mls/hr  Q8HR


 IV  1/24/25 22:00


    1/25/25 06:39


100 MLS/HR


 


 Levofloxacin  500 mg  DAILY


 PO  1/25/25 10:00


    1/25/25 10:29


500 MG


 


 Acetaminophen  650 mg  Q6HP  PRN


 PO  1/24/25 22:00


     





 


 Metoprolol


 Succinate  50 mg  DAILY


 PO  1/24/25 22:00


    1/25/25 10:28


50 MG


 


 Pantoprazole


 Sodium  40 mg  DAILY


 IV  1/25/25 10:00


    1/25/25 10:27


40 MG


 


 Sodium Chloride  1,000 ml @ 


 100 mls/hr  Q10H


 IV  1/24/25 22:15


    1/24/25 22:15


100 MLS/HR


 


 Losartan Potassium  100 mg  DAILY


 PO  1/25/25 04:45


     





 


 Amantadine HCl  100 mg  DAILY


 PO  1/25/25 10:00


     





 


 Ticagrelor  90 mg  DAILY


 PO  1/25/25 10:00


    1/25/25 10:29


90 MG


 


 Potassium Chloride  100 ml @ 


 50 mls/hr  Q2H


 IV  1/25/25 07:30


 1/25/25 11:29   











objective


GEN:  NAD





ABD:  soft.  NT/ND.


laboratory and microbiology


                                Laboratory Tests


1/25/25 05:02

















Test


 1/25/25


05:02 Range/Units


 


 


Serum Glucose 105    mg/dL








Assessment/Plan


A:


1.  poss early appendicitis but clinically stable on abx





P:


1.  d/w patient and wife.  as he is clinically stable and with no abd pain, 

normal WBC and his comorbidities, I think it is very reasonable to treat 

conservatively.  they agree and do not want surgeyr


2.  start clear liquid diet.  if tolerating, ok to DC from surgery POV with oral

levaquin/flagyl combo for 3-5 additional days.


Plan discussed with:  Patient, Spouse











CELIA GRIMES MD                 Jan 25, 2025 11:03

## 2025-01-25 NOTE — DVHSR
--------------- APPROVED REPORT --------------





EXAM: Two-dimensional and M-mode echocardiogram with Doppler and color Doppler.



Blood Pressure: 189/95 mmHg



INDICATION

SOB



RISK FACTORS

Height: 5'9", Weight: 161



DIMENSIONS 

LVDd4.2 (3.8-5.7cm)LA (2D)3.5 (1.9-4.0cm)Aortic Root4.0 (2.0-3.7cm)

LVDs3.2 (2.5-4.0cm)LA (MM) (1.9-4.0cm)Aortic Cusp Exc1.9 (1.5-2.0cm)

EF (%) 65.0 (55-70%)Rt. Atrium3.8 (1.9-4.0cm)Asc. Aorta cm

IVSd1.2 (0.7-1.1cm)RV (D)3.6 (1.8-2.4cm)

PWd1.1 (0.7-1.1cm)



Mitral Valve

MitralMitral Stenosis

E wave0.82m/sMV Mean GR.mmHg

A wave0.91m/sMV Peak GR.mmHg

E/A ratio0.92D MVAcm2

DECEL [id number] 1/2 Timems



Aortic Valve

Aortic ValveAortic Stenosis

V11.10m/Trisha Mean GR.2mmHg

V21.12m/Trisha Peak GR.5mmHg

LVOT Diameter2.3 (1.8-2.4cm)Doppler AVA4.08cm2



Pulmonic Valve

V20.83m/s



 LEFT VENTRICLE 

The there is of normal size.  There is mild left ventricular hypertrophy mostly in the septum.  Eject
ion fraction is normal and is estimated at 65%.  There is no regional wall motion abnormalities.  Cristel
stolic function appears to be preserved.  E to E prime ratio is in the normal range.



 RIGHT VENTRICLE 

The right ventricle is of normal size.  Systolic function is normal.



 ATRIA 

Both atria are of normal size.  Interatrial septum appears to be normal.



 MITRAL VALVE 

Normal structure and function.  There is mild mitral regurgitation.



 PULMONIC VALVE 

Likely normal.



 TRICUSPID VALVE 

Normal structure and function.  There is mild tricuspid regurgitation.  PA systolic pressure is not a
dequately estimated.



 AORTIC VALVE 

Trileaflet in morphology.  Normal structure and function.



 GREAT VESSELS 

Aortic root measures 4.0 cm at the sinuses of Valsalva.  Proximal ascending aorta is not visualized.



 PERICARDIAL EFFUSION 

No significant pericardial effusion.  IVC is not visualized.



Conclusion

Normal left ventricular size and systolic function.  

Ejection fraction is estimated at 65%.  

Mild left ventricular hypertrophy.  

Normal right ventricular size and systolic function.  

No hemodynamically significant valvular disease.  

Mildly dilated aortic root.

PA systolic pressure is not adequately estimated.  

No significant pericardial effusion.

## 2025-01-25 NOTE — DVH
Exam: CT CT CHEST/AB/PL W CON- IV ONLY



History: malignancy  



COMPARISON: None



Technique: Multidetector spiral CT of the abdomen and pelvis was performed from lung bases to pubic s
ymphysis.  Intravenous contrast was administered during this examination.  Portal venous  imaging was
 obtained.  Axial, coronal and sagittal multiplanar reformats were performed by the technologist on a
 separate workstation.



Radiation Dose : 



1. Abdomen/Pelvis:        CTDIvol 11 mGy, .01 mGy*cm.



CONTRAST:



Type of contrast: Omnipaque 300



Contrast injected: 100 ml 



Contrast ingested:0 ml 



Findings: 



Lung Bases: No acute or significant lung base finding.  Normal heart size. No pleural or pericardial 
effusion. Coronary artery calcification.  



Liver: The liver is normal in size.  No focal lesions. Normal hepatic vascular enhancement. Multiple 
small liver cysts.  



Gallbladder and biliary Tree: Patient status post cholecystectomy.



Spleen: Unremarkable



Pancreas: The pancreas is normal in appearance without focal lesions or abnormal enhancement.



Adrenal Glands: Unremarkable 



Kidneys:   No hydronephrosis.



Bladder: Unremarkable



Bowel: Very minimal fat stranding adjacent to the appendix, not simply change from the prior study. N
o diverticulitis.



Ascites: Absent



Lymphadenopathy: No mesenteric, retroperitoneal or periportal lymphadenopathy. 



Abdominal wall and Mesentery: Unremarkable.



Vasculature: The visualized abdominal aorta is normal in size and caliber. Abdominal and pelvic vesse
ls demonstrate normal enhancement. Mild ectasis of the ascending aortia. No pulmonary emboli.



Pelvic Organs: Moderate enlargement of the prostate.



Musculoskeletal: Grade 1 spondylolisthesis of L5 on S1. Bilateral pars defects. Moderate lumbar spond
ylosis.   



IMPRESSION: 



1. No interval change in the minimal fat stranding surrounding the appendix. No abscess.



2. No significant change compared to the prior study.



Radiation optimization: All CT scans at this facility use at least one of these dose optimization aaliyah
hniques: Automated exposure control mA and/or kV adjustment per patient size (includes targeted exams
 where dose is matched to clinical indication) or iterative reconstruction.



Electronically Signed by: Carlos Dunne at 01/25/2025 10:28:46 AM

## 2025-01-26 VITALS
RESPIRATION RATE: 18 BRPM | SYSTOLIC BLOOD PRESSURE: 142 MMHG | OXYGEN SATURATION: 94 % | TEMPERATURE: 97.4 F | HEART RATE: 75 BPM | DIASTOLIC BLOOD PRESSURE: 79 MMHG

## 2025-01-26 VITALS
OXYGEN SATURATION: 98 % | SYSTOLIC BLOOD PRESSURE: 179 MMHG | TEMPERATURE: 97.3 F | RESPIRATION RATE: 18 BRPM | HEART RATE: 62 BPM | DIASTOLIC BLOOD PRESSURE: 95 MMHG

## 2025-01-26 VITALS
TEMPERATURE: 97.9 F | SYSTOLIC BLOOD PRESSURE: 173 MMHG | OXYGEN SATURATION: 97 % | RESPIRATION RATE: 18 BRPM | DIASTOLIC BLOOD PRESSURE: 71 MMHG | HEART RATE: 65 BPM

## 2025-01-26 VITALS
DIASTOLIC BLOOD PRESSURE: 75 MMHG | OXYGEN SATURATION: 96 % | HEART RATE: 78 BPM | TEMPERATURE: 97.6 F | SYSTOLIC BLOOD PRESSURE: 145 MMHG | RESPIRATION RATE: 18 BRPM

## 2025-01-26 VITALS
OXYGEN SATURATION: 95 % | DIASTOLIC BLOOD PRESSURE: 84 MMHG | RESPIRATION RATE: 19 BRPM | TEMPERATURE: 97.8 F | HEART RATE: 71 BPM | SYSTOLIC BLOOD PRESSURE: 154 MMHG

## 2025-01-26 VITALS
DIASTOLIC BLOOD PRESSURE: 91 MMHG | RESPIRATION RATE: 18 BRPM | OXYGEN SATURATION: 95 % | SYSTOLIC BLOOD PRESSURE: 163 MMHG | TEMPERATURE: 98.1 F | HEART RATE: 72 BPM

## 2025-01-26 VITALS — HEART RATE: 66 BPM | OXYGEN SATURATION: 97 % | RESPIRATION RATE: 18 BRPM

## 2025-01-26 VITALS — HEART RATE: 75 BPM | OXYGEN SATURATION: 96 % | RESPIRATION RATE: 18 BRPM

## 2025-01-26 VITALS — HEART RATE: 66 BPM

## 2025-01-26 LAB
ANION GAP SERPL CALCULATED.3IONS-SCNC: 9 MMOL/L (ref 5–15)
BUN SERPL-MCNC: 25 MG/DL (ref 9–23)
BUN/CREAT SERPL: 17.7 (ref 10–20)
CALCIUM SERPL-MCNC: 12.5 MG/DL (ref 8.7–10.4)
CALCIUM SERPL-MCNC: 12.8 MG/DL (ref 8.7–10.4)
CHLORIDE SERPL-SCNC: 104 MMOL/L (ref 98–107)
CO2 SERPL-SCNC: 29 MMOL/L (ref 20–31)
GLUCOSE SERPL-MCNC: 81 MG/DL (ref 74–106)
HCT VFR BLD AUTO: 32.6 % (ref 41–53)
HGB BLD-MCNC: 11 G/DL (ref 13.5–17.5)
MCH RBC QN AUTO: 28.9 PG (ref 28–32)
MCV RBC AUTO: 85.4 FL (ref 80–100)
NRBC BLD QL AUTO: 0 %
POTASSIUM SERPL-SCNC: 2.9 MMOL/L (ref 3.5–5.1)
POTASSIUM SERPL-SCNC: 3.1 MMOL/L (ref 3.5–5.1)
SODIUM SERPL-SCNC: 142 MMOL/L (ref 136–145)

## 2025-01-26 RX ADMIN — HYDRALAZINE HYDROCHLORIDE ONE MG: 20 INJECTION INTRAMUSCULAR; INTRAVENOUS at 12:53

## 2025-01-26 RX ADMIN — POTASSIUM BICARBONATE ONE MEQ: 978 TABLET, EFFERVESCENT ORAL at 10:03

## 2025-01-26 RX ADMIN — POTASSIUM CHLORIDE SCH MLS/HR: 200 INJECTION, SOLUTION INTRAVENOUS at 12:15

## 2025-01-26 NOTE — DVHPN2
Progress Note


Date Seen:  Jan 26, 2025


Medical Necessity Reason


Pt with a Central, PICC or Fol:  No





Subjective


Patient reports:  No new complaints


Other Systems:  


Patient seen and examined by myself today in follow-up





Objective


vital signs





                                   Vital Sign








  Date Time  Temp Pulse Resp B/P (MAP) Pulse Ox O2 Delivery O2 Flow Rate FiO2


 


1/26/25 10:04    173/71    


 


1/26/25 10:03  65      


 


1/26/25 09:00 97.9  18  97   





 97.9       


 


1/26/25 08:00      Room Air* 0 21














                           Total Intake and Output   


 


 1/25/25 1/25/25 1/26/25





 15:00 23:00 07:00


 


Intake Total 100 ml 1430 ml 300 ml


 


Output Total  850 ml 350 ml


 


Balance 100 ml 580 ml -50 ml








medications





                               Current Medications








 Medications  Dose


 Ordered  Sig/Carmen


 Route  Start Time


 Stop Time Status Last Admin


Dose Admin


 


 Metronidazole  100 ml @ 


 100 mls/hr  Q8HR


 IV  1/24/25 22:00


    1/26/25 05:02


100 MLS/HR


 


 Levofloxacin  500 mg  DAILY


 PO  1/25/25 10:00


    1/26/25 10:04


500 MG


 


 Acetaminophen  650 mg  Q6HP  PRN


 PO  1/24/25 22:00


    1/25/25 15:45


650 MG


 


 Metoprolol


 Succinate  50 mg  DAILY


 PO  1/24/25 22:00


    1/26/25 10:03


50 MG


 


 Pantoprazole


 Sodium  40 mg  DAILY


 IV  1/25/25 10:00


    1/26/25 10:03


40 MG


 


 Sodium Chloride  1,000 ml @ 


 100 mls/hr  Q10H


 IV  1/24/25 22:15


    1/25/25 21:33


100 MLS/HR


 


 Losartan Potassium  100 mg  DAILY


 PO  1/25/25 04:45


    1/26/25 10:04


100 MG


 


 Amantadine HCl  100 mg  DAILY


 PO  1/25/25 10:00


    1/25/25 12:22


100 MG


 


 Ticagrelor  90 mg  DAILY


 PO  1/25/25 10:00


    1/26/25 10:04


90 MG








Examination:  LUNGS:Normal, CVS:Normal, MSK:Normal


laboratory and microbiology


                                Laboratory Tests


1/26/25 09:47








1/26/25 05:07

















Test


 1/26/25


05:07 Range/Units


 


 


Serum Glucose 81    mg/dL








                                  Microbiology








 Date/Time


Source Procedure


Growth Status





 


 1/24/25 16:11


Blood Blood Culture - Preliminary


NO GROWTH AFTER 24 HOURS OF INCUBATION. Resulted


 


 1/24/25 15:28


Voided Urine Urine Culture - Preliminary


 Resulted











Problem List/Assessment/Plan


Problem List/Assessment/Plan


Acute kidney injury secondary hemodynamic mediated


Hypercalcemia rule out malignancy


Chronic diastolic heart failure


AFib


Hypertension


Parkinson's disease


Hypokalemia





Recommendations


Kidney function stabilize Chronic Kidney Disease stage IIIA


Increased urine output


Strict I&Os


Kidney reported within normal limit on CT scan of the


Aggressive KCL replacement


Consider bisphosphonate IV


Malignancy workup per primary team





I will sign off this case, please refer to my clinic two weeks after discharge 

for Chronic Kidney Disease follow-up 


Thank you for the consult


Plan discussed with:  Patient











RANJAN SAENZ MD        Jan 26, 2025 10:56

## 2025-01-26 NOTE — DVHPN2
Progress Note - Dictate


Date Seen:  Jan 26, 2025


Medical Necessity Reason


Pt with a Central, PICC or Fol:  No


Subjective


E:  no major events o/n.





no complaints.  denies abdominal pain.  piero clear liquid diet.


vital signs





                                   Vital Sign








  Date Time  Temp Pulse Resp B/P (MAP) Pulse Ox O2 Delivery O2 Flow Rate FiO2


 


1/26/25 10:04    173/71    


 


1/26/25 10:03  65      


 


1/26/25 09:00 97.9  18  97   





 97.9       


 


1/26/25 08:00      Room Air* 0 21














                           Total Intake and Output   


 


 1/25/25 1/25/25 1/26/25





 15:00 23:00 07:00


 


Intake Total 100 ml 1430 ml 300 ml


 


Output Total  850 ml 350 ml


 


Balance 100 ml 580 ml -50 ml








medications





                               Current Medications








 Medications  Dose


 Ordered  Sig/Carmen


 Route  Start Time


 Stop Time Status Last Admin


Dose Admin


 


 Metronidazole  100 ml @ 


 100 mls/hr  Q8HR


 IV  1/24/25 22:00


    1/26/25 05:02


100 MLS/HR


 


 Levofloxacin  500 mg  DAILY


 PO  1/25/25 10:00


    1/26/25 10:04


500 MG


 


 Acetaminophen  650 mg  Q6HP  PRN


 PO  1/24/25 22:00


    1/25/25 15:45


650 MG


 


 Metoprolol


 Succinate  50 mg  DAILY


 PO  1/24/25 22:00


    1/26/25 10:03


50 MG


 


 Pantoprazole


 Sodium  40 mg  DAILY


 IV  1/25/25 10:00


    1/26/25 10:03


40 MG


 


 Sodium Chloride  1,000 ml @ 


 100 mls/hr  Q10H


 IV  1/24/25 22:15


    1/25/25 21:33


100 MLS/HR


 


 Losartan Potassium  100 mg  DAILY


 PO  1/25/25 04:45


    1/26/25 10:04


100 MG


 


 Amantadine HCl  100 mg  DAILY


 PO  1/25/25 10:00


    1/25/25 12:22


100 MG


 


 Ticagrelor  90 mg  DAILY


 PO  1/25/25 10:00


    1/26/25 10:04


90 MG








objective


GEN:  NAD





ABD:  soft.  NT/ND.


laboratory and microbiology


                                Laboratory Tests


1/26/25 09:47








1/26/25 05:07

















Test


 1/26/25


05:07 Range/Units


 


 


Serum Glucose 81    mg/dL








Assessment/Plan


A:


1.  poss early appendicitis but clinically stable on abx





P:


1.  advance to full liquid diet. 


2.  stable from surgery POV.  if discharged, recommend oral levaquin/flagyl 

combo for 2-3 additional days.


Plan discussed with:  Patient, Spouse, Daughter











SYDNEYCELIANATHALIA PORTER MD                 Jan 26, 2025 10:48

## 2025-01-26 NOTE — DVHPNRES
Progress Note


Date Seen:  Jan 26, 2025


Resident Creating Document:  JUN BETANCOURT RESIDENT


Has the PT tested + for MRSA


If YES, has PT been informed?:  No





Medical Necessity Reason


Pt with a Central, PICC or Fol:  No





Subjective


Review of Systems


This is a 74-year-old male with past medical history of atrial fibrillation s/p 

ablation in 2019, CAD s/p PTCA x3 with stenting of left main, lad and 

circumflex, ulcerative colitis diagnosed in April 2024, bilateral pulmonary 

embolisms, Parkinson disease, systolic heart failure, asthma, hypertension, who 

came in due to abdominal pain that has been ongoing for the past 2 weeks.  Per 

patient's wife, he has also been experiencing dysuria for the past 2 days along 

with some confusion and delusions/hallucinations.  They called patient's PCP who

prescribed Levaquin, patient took 1 dose of Levaquin yesterday on 01/22/2025.  

However, due to continued dysuria, confusion and worsening generalized weakness 

with inability to walk is what prompted this visit to the hospital. On admission

patient was noted to have hypercalcemia of 15.2 and subsequent measurement 

became 13.9>13.2, PTH 4.8 and vitamin D 121.4.  Patient was admitted for further

assessment and management.





Patient seen and examined at bedside.  The patient is alert and oriented in 

person, place and time with some parkinsonism type of tremor.  The patient and 

wife was at bedside both states that the patient still weak and feels fatigued. 

The patient also states that feels much better compared to admission.  Blood 

pressure was significantly elevated this morning for which hydralazine 10 mg IV 

was gaved.  Blood pressure was reviewed has been elevated since admission reason

why we will add amlodipine 5 mg daily.  Patient still has hypercalcemia of 

unknown source.  We will continue to try five the cause and possible discharge 

tomorrow if patient keeps improving.





ROS





Constitutional:  Reports generalized weakness and fatigue.  Denies weight loss, 

fever and chills.


HEENT: Denies changes in vision and hearing.


Respiratory: Denies shortness of breath and cough


Cardiovascular: Denies chest discomfort or palpitations


GI: Denies abdominal pain, nausea, vomiting and diarrhea.


: Denies dysuria and urinary frequency.


Musculoskeletal: Denies myalgias and joint pain


Skin: Denies rash and pruritus.


Neurological: Denies dizziness, headache, vision or hearing problems





Objective


vital signs





                                   Vital Sign








  Date Time  Temp Pulse Resp B/P (MAP) Pulse Ox O2 Delivery O2 Flow Rate FiO2


 


1/26/25 12:53    179/95    


 


1/26/25 10:03  65      


 


1/26/25 09:00 97.9  18  97   





 97.9       


 


1/26/25 08:00      Room Air* 0 21














                           Total Intake and Output   


 


 1/25/25 1/25/25 1/26/25





 15:00 23:00 07:00


 


Intake Total 100 ml 1430 ml 300 ml


 


Output Total  850 ml 350 ml


 


Balance 100 ml 580 ml -50 ml








medications





                               Current Medications








 Medications  Dose


 Ordered  Sig/Carmen


 Route  Start Time


 Stop Time Status Last Admin


Dose Admin


 


 Metronidazole  100 ml @ 


 100 mls/hr  Q8HR


 IV  1/24/25 22:00


    1/26/25 05:02


100 MLS/HR


 


 Levofloxacin  500 mg  DAILY


 PO  1/25/25 10:00


    1/26/25 10:04


500 MG


 


 Acetaminophen  650 mg  Q6HP  PRN


 PO  1/24/25 22:00


    1/25/25 15:45


650 MG


 


 Metoprolol


 Succinate  50 mg  DAILY


 PO  1/24/25 22:00


    1/26/25 10:03


50 MG


 


 Pantoprazole


 Sodium  40 mg  DAILY


 IV  1/25/25 10:00


    1/26/25 10:03


40 MG


 


 Sodium Chloride  1,000 ml @ 


 100 mls/hr  Q10H


 IV  1/24/25 22:15


    1/26/25 12:15


100 MLS/HR


 


 Losartan Potassium  100 mg  DAILY


 PO  1/25/25 04:45


    1/26/25 10:04


100 MG


 


 Amantadine HCl  100 mg  DAILY


 PO  1/25/25 10:00


    1/26/25 11:51


100 MG


 


 Ticagrelor  90 mg  DAILY


 PO  1/25/25 10:00


    1/26/25 10:04


90 MG


 


 Potassium Chloride  100 ml @ 


 50 mls/hr  Q2H


 IV  1/26/25 11:00


 1/26/25 18:59  1/26/25 12:15


50 MLS/HR








Examination


Physical Examination





General: Patient alert and oriented in person, place and time.  Parkinsonism 

type of tremor on bilateral hands.  Patient following commands.


HEENT: Normocephalic, atraumatic, moist mucous membranes


Respiratory/pulmonary: Clear lungs bilaterally, no associated crackles or 

wheezes.


Cardiovascular: Normal heart sounds S1 and S2 with no associated murmurs


Abdomen: Abdomen nondistended, there is no pain to palpation in any of the 

abdominal quadrants, no palpable masses.


Extremities: There is no peripheral edema present at the lower extremities. 


Peripheral Pulses:   3+ Radial (R). 3+ Radial (L). 3+ Dorsalis pedis (R). 3+ 

Dorsalis pedis(L)


Skin: No rashes or pruritus, there is no sacral edema present at this time.


Neurological: Intact cranial nerves with no focal neurologic deficits


laboratory and microbiology


                                Laboratory Tests


1/26/25 09:47








1/26/25 05:07

















Test


 1/26/25


05:07 Range/Units


 


 


Serum Glucose 81    mg/dL








                                  Microbiology








 Date/Time


Source Procedure


Growth Status





 


 1/24/25 16:11


Blood Blood Culture - Preliminary


NO GROWTH AFTER 24 HOURS OF INCUBATION. Resulted


 


 1/24/25 15:28


Voided Urine Urine Culture - Preliminary


 Resulted








Labs and/or images reviewed:  Labs reviewed by me, Image(s) reviewed by me





Problem List/Assessment/Plan


Problem List/Assessment/Plan


Assessment/Plan





Subacute/chronic hypercalcemia with minimal symptoms likely due to 

hypervitaminosis vs. granulomatous disease


Rule out malignancy


-IV NS at 100 ml/hr


-serum calcium 15.2 > 13.9>13.2, Last one 12.5


-PTH 4.8, vitamin-D 121.4


-CXR: Clear lungs


-CT abdomen pelvis with contrast revealed minimal fat stranding around the 

appendix.


-ordered PSA


-EKG q.4 hours





Possible acute appendicitis


History of ulcerative colitis, currently not on any treatment


- CT abdomen pelvis: Mild fat stranding surrounding the appendix suggestive of 

acute appendicitis, there is no periappendiceal fluid collection or free air.  

Prostatomegaly.


- Continue IV metronidazole 500 mg q8hr and levofloxacin 500 mg po daily.


- Surgery recommended conservative management and advanced to Full liquid diet.





Coronary artery disease s/p PTCA x3


Hypertension heart disease


- Continue Brilinta 90 mg p.o. daily, losartan 100 mg daily and metoprolol 

succinate 50 mg daily.


- echo from 2020:  Left atrial enlargement.  EF 60%.


-BP was high, IV hydralazine 10mg was gaved but still elevated. We added 

Amolidipine 5 mg daily


-Monitor BP


- BNP 35.27 


-Echocardiogram showed an LVEF of 65% with no significant valvular abnormalities

or pericardial effusion.





Hypokalemia 


- Replenished


- Ordered repeat potassium





PABLO likely secondary to hemodynamically mediated/VMN 


- IV NS 1 L bolus 


-Continue IV NS maintenance at 100 cc/hour


- Monitor BMP





History of Parkinson's disease 


- Continue amantadine 100 mg p.o. daily








Plan discussed with Dr. Eastman


Plan discussed with:  Patient, Spouse, Other (RN)





My Orders


My Orders





                     Orders - JUN BETANCOURT








Procedure Category Date Status





  Time 


 


Pt Request For Service PT 1/26/25 Logged





  12:09 


 


*  CONS 1/26/25 Transmitted





Consult   











Addendum


I was physically present for the key portions of the service provided to patient

by THE RESIDENT. I have reviewed the documentation, discussed the case with 

resident and agree with the resident's documentation except as noted.  Also the 

patient's clinical case was discussed with the patient's nurse.








This medical document was created using an electronic medical record system with

computerized dictation system.  Although this document has been carefully 

reviewed, there might still be some phonetic and typographical errors.  These 

areas are purely typographical due to imperfections of the software programs, 

and do not reflect any compromise in the patient's medical care.





Late signature.





Date of Service:  Jan 26, 2025


Billing Provider:  KEON EASTMAN MD


Common Visit Codes:  24457-[id number] INP/OBS CARE(HIGH)











JUN BETANCOURT RESIDENT Jan 26, 2025 13:49


KEON EASTMAN MD             Jan 27, 2025 04:15

## 2025-01-27 VITALS
HEART RATE: 79 BPM | TEMPERATURE: 97.5 F | DIASTOLIC BLOOD PRESSURE: 95 MMHG | OXYGEN SATURATION: 97 % | SYSTOLIC BLOOD PRESSURE: 160 MMHG | RESPIRATION RATE: 16 BRPM

## 2025-01-27 VITALS
HEART RATE: 70 BPM | TEMPERATURE: 97.5 F | RESPIRATION RATE: 16 BRPM | OXYGEN SATURATION: 100 % | DIASTOLIC BLOOD PRESSURE: 89 MMHG | SYSTOLIC BLOOD PRESSURE: 158 MMHG

## 2025-01-27 VITALS
OXYGEN SATURATION: 97 % | DIASTOLIC BLOOD PRESSURE: 74 MMHG | RESPIRATION RATE: 18 BRPM | TEMPERATURE: 97.7 F | SYSTOLIC BLOOD PRESSURE: 145 MMHG | HEART RATE: 71 BPM

## 2025-01-27 VITALS
OXYGEN SATURATION: 96 % | RESPIRATION RATE: 18 BRPM | HEART RATE: 78 BPM | DIASTOLIC BLOOD PRESSURE: 85 MMHG | TEMPERATURE: 97.9 F | SYSTOLIC BLOOD PRESSURE: 139 MMHG

## 2025-01-27 VITALS
OXYGEN SATURATION: 94 % | DIASTOLIC BLOOD PRESSURE: 90 MMHG | TEMPERATURE: 97.7 F | SYSTOLIC BLOOD PRESSURE: 165 MMHG | HEART RATE: 62 BPM | RESPIRATION RATE: 19 BRPM

## 2025-01-27 VITALS — RESPIRATION RATE: 16 BRPM | HEART RATE: 79 BPM | OXYGEN SATURATION: 97 %

## 2025-01-27 VITALS
TEMPERATURE: 97.5 F | RESPIRATION RATE: 16 BRPM | HEART RATE: 67 BPM | SYSTOLIC BLOOD PRESSURE: 149 MMHG | DIASTOLIC BLOOD PRESSURE: 67 MMHG | OXYGEN SATURATION: 95 %

## 2025-01-27 VITALS — HEART RATE: 69 BPM

## 2025-01-27 VITALS — OXYGEN SATURATION: 97 % | RESPIRATION RATE: 16 BRPM | HEART RATE: 79 BPM

## 2025-01-27 LAB
ANION GAP SERPL CALCULATED.3IONS-SCNC: 10 MMOL/L (ref 5–15)
BUN SERPL-MCNC: 21 MG/DL (ref 9–23)
BUN/CREAT SERPL: 15 (ref 10–20)
CALCIUM SERPL-MCNC: 12.8 MG/DL (ref 8.7–10.4)
CHLORIDE SERPL-SCNC: 103 MMOL/L (ref 98–107)
CO2 SERPL-SCNC: 28 MMOL/L (ref 20–31)
GLUCOSE SERPL-MCNC: 95 MG/DL (ref 74–106)
HCT VFR BLD AUTO: 32.4 % (ref 41–53)
HGB BLD-MCNC: 11 G/DL (ref 13.5–17.5)
MCH RBC QN AUTO: 29.1 PG (ref 28–32)
MCV RBC AUTO: 85.2 FL (ref 80–100)
NRBC BLD QL AUTO: 0 %
POTASSIUM SERPL-SCNC: 3.1 MMOL/L (ref 3.5–5.1)
SODIUM SERPL-SCNC: 141 MMOL/L (ref 136–145)

## 2025-01-27 RX ADMIN — PAMIDRONATE DISODIUM ONE MLS/HR: 30 INJECTION, POWDER, LYOPHILIZED, FOR SOLUTION INTRAVENOUS at 17:42

## 2025-01-27 RX ADMIN — HYDRALAZINE HYDROCHLORIDE ONE MG: 20 INJECTION INTRAMUSCULAR; INTRAVENOUS at 05:30

## 2025-01-27 RX ADMIN — POTASSIUM BICARBONATE ONE MEQ: 978 TABLET, EFFERVESCENT ORAL at 09:19

## 2025-01-27 RX ADMIN — POTASSIUM BICARBONATE ONE MEQ: 978 TABLET, EFFERVESCENT ORAL at 06:00

## 2025-01-27 RX ADMIN — CARVEDILOL SCH MG: 12.5 TABLET, FILM COATED ORAL at 21:56

## 2025-01-27 RX ADMIN — FUROSEMIDE ONE MG: 10 INJECTION, SOLUTION INTRAMUSCULAR; INTRAVENOUS at 16:45

## 2025-01-27 NOTE — DVH
ULTRASOUND RENAL



CLINICAL INDICATION: Renal artery stenosis



TECHNIQUE: Real-time sonographic, duplex, and color Doppler images of the kidneys were obtained.



FINDINGS: The right kidney measures 12 cm and is normal in size. The right renal echogenicity, contou
r and cortical thickness are within normal limits. No hydronephrosis, large masses or perinephric flu
id is seen.



The left kidney measures 12 cm and is normal in size. The left renal echogenicity, contour, and corti
ismael thickness are within normal limits. no hydronephrosis, large masses or perinephric fluid is seen.




Right renal resistive index measures 0.8. Renal PSV/ aorta PSV ratio equals 1.3.



Left renal resistive index measures 0.74. Left renal PSV /aorta PSV ratio equals 0.7.



IMPRESSION: 



Mildly elevated right renal resistive index.  However consider MRA renal artery protocol to evaluate 
the right renal artery.



Electronically Signed by: Sanket Ann at 01/27/2025 13:14:52 PM

## 2025-01-27 NOTE — DVHPNRES
Progress Note


Date Seen:  Jan 27, 2025


Resident Creating Document:  ISABELLA DERAS RESIDENT


Has the PT tested + for MRSA


If YES, has PT been informed?:  No





Medical Necessity Reason


Pt with a Central, PICC or Fol:  No





Subjective


Review of Systems


This is a 74-year-old male with past medical history of atrial fibrillation s/p 

ablation in 2019, CAD s/p PTCA x3 with stenting of left main, lad and 

circumflex, ulcerative colitis diagnosed in April 2024, bilateral pulmonary 

embolisms, Parkinson disease, systolic heart failure, asthma, hypertension, who 

came in due to abdominal pain that has been ongoing for the past 2 weeks.  Per 

patient's wife, he has also been experiencing dysuria for the past 2 days along 

with some confusion and delusions/hallucinations.  They called patient's PCP who

prescribed Levaquin, patient took 1 dose of Levaquin yesterday on 01/22/2025.  

However, due to continued dysuria, confusion and worsening generalized weakness 

with inability to walk is what prompted this visit to the hospital. On admission

patient was noted to have hypercalcemia of 15.2 and subsequent measurement 

became 13.9>13.2, PTH 4.8 and vitamin D 121.4.  Patient was admitted for further

assessment and management.





Patient was seen and examined on the bed side. He is alert, orientedX3. 

Complains of anxiety and flushing. Readjustment of blood pressure medication to 

losartan 100 mg p.o. daily, amlodipine 5 mg daily, carvedilol 12.5 mg b.i.d. and

IV hydralazine 10 mg q.6 p.r.n.Patient still has hypercalcemia of unknown 

source.  Ordered LDH, IV Lasix 40 mg once and IV bisphosphonate 15 mg in 250 mL 

of normal saline once as per Nephrology recommendation.  consulted Oncology for 

further recommendation regarding the cause of the hypercalcemia and possible 

discharge tomorrow if patient continues to improving and blood pressure is 

controlled and in the safe range for discharge.





Objective


vital signs





                                   Vital Sign








  Date Time  Temp Pulse Resp B/P (MAP) Pulse Ox O2 Delivery O2 Flow Rate FiO2


 


1/27/25 12:37 97.5 67 16 149/67 (94) 95   





 97.5       


 


1/27/25 08:00      Room Air* 0 21














                           Total Intake and Output   


 


 1/26/25 1/26/25 1/27/25





 15:00 23:00 07:00


 


Intake Total 280 ml 700 ml 200 ml


 


Output Total  600 ml 300 ml


 


Balance 280 ml 100 ml -100 ml








medications





                               Current Medications








 Medications  Dose


 Ordered  Sig/Carmen


 Route  Start Time


 Stop Time Status Last Admin


Dose Admin


 


 Metronidazole  100 ml @ 


 100 mls/hr  Q8HR


 IV  1/24/25 22:00


    1/27/25 13:24


100 MLS/HR


 


 Levofloxacin  500 mg  DAILY


 PO  1/25/25 10:00


    1/27/25 09:17


500 MG


 


 Acetaminophen  650 mg  Q6HP  PRN


 PO  1/24/25 22:00


    1/25/25 15:45


650 MG


 


 Pantoprazole


 Sodium  40 mg  DAILY


 IV  1/25/25 10:00


    1/27/25 09:18


40 MG


 


 Sodium Chloride  1,000 ml @ 


 100 mls/hr  Q10H


 IV  1/24/25 22:15


    1/27/25 13:24


100 MLS/HR


 


 Losartan Potassium  100 mg  DAILY


 PO  1/25/25 04:45


    1/27/25 09:19


100 MG


 


 Amantadine HCl  100 mg  DAILY


 PO  1/25/25 10:00


    1/27/25 09:18


100 MG


 


 Ticagrelor  90 mg  DAILY


 PO  1/25/25 10:00


    1/27/25 09:18


90 MG


 


 Amlodipine


 Besylate  5 mg  DAILY


 PO  1/26/25 15:00


    1/27/25 09:17


5 MG


 


 Carvedilol  12.5 mg  Q12HR


 PO  1/27/25 22:00


     











Examination





Physical Examination





General: Patient alert and oriented in person, place and time.  Parkinsonism 

type of tremor on bilateral hands.  Patient following commands.


HEENT: Normocephalic, atraumatic, moist mucous membranes


Respiratory/pulmonary: Clear lungs bilaterally, no associated crackles or 

wheezes.


Cardiovascular: Normal heart sounds S1 and S2 with no associated murmurs


Abdomen: Abdomen nondistended, there is no pain to palpation in any of the 

abdominal quadrants, no palpable masses.


Extremities: There is no peripheral edema present at the lower extremities. 


Peripheral Pulses:   3+ Radial (R). 3+ Radial (L). 3+ Dorsalis pedis (R). 3+ 

Dorsalis pedis(L)


Skin: No rashes or pruritus, there is no sacral edema present at this time.


Neurological: Intact cranial nerves with no focal neurologic deficits


laboratory and microbiology


                                Laboratory Tests


1/27/25 05:21

















Test


 1/27/25


05:21 Range/Units


 


 


Serum Glucose 95    mg/dL








                                  Microbiology








 Date/Time


Source Procedure


Growth Status





 


 1/24/25 16:11


Blood Blood Culture - Preliminary


NO GROWTH AFTER 72 HOURS OF INCUBATION. Resulted


 


 1/24/25 15:28


Voided Urine Urine Culture - Final


 Complete








Labs and/or images reviewed:  Labs reviewed by me, Image(s) reviewed by me





Problem List/Assessment/Plan


Problem List/Assessment/Plan





Assessment/Plan





Subacute/chronic hypercalcemia with minimal symptoms likely due to 

hypervitaminosis vs. granulomatous disease


Rule out malignancy


-IV NS at 100 ml/hr


-serum calcium 15.2 > 13.9>13.2, Last one 12.8


-PTH 4.8, vitamin-D 121.4


-CXR: Clear lungs


-CT abdomen pelvis with contrast revealed minimal fat stranding around the 

appendix.


-ordered PSA, LDH, Calcitriol, metanephrines frac free plasma 


- IV lasix 40 mg once.


- IV pamidronate sodium 15 mg in 250 ml of 0.9% NaCL once.


-EKG q.4 hours





Possible acute appendicitis


History of ulcerative colitis, currently not on any treatment


- CT abdomen pelvis: Mild fat stranding surrounding the appendix suggestive of 

acute appendicitis, there is no periappendiceal fluid collection or free air.  

Prostatomegaly.


- Continue IV metronidazole 500 mg q8hr and levofloxacin 500 mg po daily.


- Surgery recommended conservative management and advanced to Full liquid diet.





Coronary artery disease s/p PTCA x3


Hypertension heart disease


- Continue Brilinta 90 mg p.o. daily, losartan 100 mg daily , amlodipine 5 mg 

daily, and carvedilol 12.5 mg b.i.d 


- echo from 2020:  Left atrial enlargement.  EF 60%.


- IV hydralazine 10 mg q6prn.


- Monitor BP


- BNP 35.27 


-Echocardiogram showed an LVEF of 65% with no significant valvular abnormalities

or pericardial effusion.


- Renal artery u/s mentioned mildly elevated right renal resistive index.





Hypokalemia 


- Replenished


- Ordered repeat potassium





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 


- IV NS 1 L bolus 


-Continue IV NS maintenance at 100 cc/hour


- Monitor BMP





History of Parkinson's disease 


- Continue amantadine 100 mg p.o. daily





Diet - Cardiac diet


PUD prophylaxis: Pepcid 20 mg daily


DVT prophylaxis: Pt is on Brilinta


Code status      : Full code





Plan discussed with Dr. Matt


Plan discussed with:  Patient, Other





Date of Service:  Jan 27, 2025


Billing Provider:  CHAPO NAVARRO MD


Common Visit Codes:  [id number] INP/OBS CARE(HIGH)











ISABELLA DERAS RESIDENT        Jan 27, 2025 16:23


CHAPO NAVARRO MD           Jan 28, 2025 09:43

## 2025-01-28 VITALS
SYSTOLIC BLOOD PRESSURE: 150 MMHG | TEMPERATURE: 97.9 F | HEART RATE: 78 BPM | RESPIRATION RATE: 18 BRPM | DIASTOLIC BLOOD PRESSURE: 92 MMHG | OXYGEN SATURATION: 95 %

## 2025-01-28 VITALS
RESPIRATION RATE: 8 BRPM | OXYGEN SATURATION: 96 % | SYSTOLIC BLOOD PRESSURE: 151 MMHG | HEART RATE: 71 BPM | DIASTOLIC BLOOD PRESSURE: 95 MMHG | TEMPERATURE: 97.7 F

## 2025-01-28 VITALS
SYSTOLIC BLOOD PRESSURE: 168 MMHG | DIASTOLIC BLOOD PRESSURE: 68 MMHG | RESPIRATION RATE: 18 BRPM | TEMPERATURE: 97.3 F | HEART RATE: 75 BPM | OXYGEN SATURATION: 96 %

## 2025-01-28 VITALS — OXYGEN SATURATION: 97 % | HEART RATE: 76 BPM | RESPIRATION RATE: 16 BRPM

## 2025-01-28 VITALS
OXYGEN SATURATION: 96 % | DIASTOLIC BLOOD PRESSURE: 68 MMHG | SYSTOLIC BLOOD PRESSURE: 137 MMHG | RESPIRATION RATE: 19 BRPM | TEMPERATURE: 98.9 F | HEART RATE: 73 BPM

## 2025-01-28 VITALS
OXYGEN SATURATION: 94 % | SYSTOLIC BLOOD PRESSURE: 140 MMHG | HEART RATE: 74 BPM | RESPIRATION RATE: 18 BRPM | DIASTOLIC BLOOD PRESSURE: 68 MMHG | TEMPERATURE: 97.9 F

## 2025-01-28 LAB
ALBUMIN SERPL-MCNC: 3.4 G/DL (ref 3.2–4.8)
ALP SERPL-CCNC: 158 U/L (ref 46–116)
ALT SERPL-CCNC: 20 U/L (ref 7–40)
ANION GAP SERPL CALCULATED.3IONS-SCNC: 8 MMOL/L (ref 5–15)
BILIRUB SERPL-MCNC: 0.6 MG/DL (ref 0.2–1)
BUN SERPL-MCNC: 21 MG/DL (ref 9–23)
BUN SERPL-MCNC: 24 MG/DL (ref 9–23)
BUN SERPL-MCNC: 26 MG/DL (ref 9–23)
BUN/CREAT SERPL: 15.3 (ref 10–20)
BUN/CREAT SERPL: 16.3 (ref 10–20)
BUN/CREAT SERPL: 17 (ref 10–20)
CALCIUM SERPL-MCNC: 11.9 MG/DL (ref 8.7–10.4)
CALCIUM SERPL-MCNC: 12.1 MG/DL (ref 8.7–10.4)
CALCIUM SERPL-MCNC: 12.7 MG/DL (ref 8.7–10.4)
CHLORIDE SERPL-SCNC: 102 MMOL/L (ref 98–107)
CHLORIDE SERPL-SCNC: 103 MMOL/L (ref 98–107)
CHLORIDE SERPL-SCNC: 104 MMOL/L (ref 98–107)
CO2 SERPL-SCNC: 30 MMOL/L (ref 20–31)
GLUCOSE SERPL-MCNC: 100 MG/DL (ref 74–106)
GLUCOSE SERPL-MCNC: 102 MG/DL (ref 74–106)
GLUCOSE SERPL-MCNC: 107 MG/DL (ref 74–106)
HCT VFR BLD AUTO: 31.8 % (ref 41–53)
HGB BLD-MCNC: 11 G/DL (ref 13.5–17.5)
MCH RBC QN AUTO: 29.5 PG (ref 28–32)
MCV RBC AUTO: 85.2 FL (ref 80–100)
NRBC BLD QL AUTO: 0.2 %
POTASSIUM SERPL-SCNC: 3.1 MMOL/L (ref 3.5–5.1)
POTASSIUM SERPL-SCNC: 3.3 MMOL/L (ref 3.5–5.1)
POTASSIUM SERPL-SCNC: 3.9 MMOL/L (ref 3.5–5.1)
PROT SERPL-MCNC: 5.3 G/DL (ref 5.7–8.2)
PSA SERPL-MCNC: 2.1 NG/ML (ref 0–4)
SODIUM SERPL-SCNC: 140 MMOL/L (ref 136–145)
SODIUM SERPL-SCNC: 141 MMOL/L (ref 136–145)
SODIUM SERPL-SCNC: 142 MMOL/L (ref 136–145)

## 2025-01-28 RX ADMIN — POTASSIUM BICARBONATE ONE MEQ: 978 TABLET, EFFERVESCENT ORAL at 10:21

## 2025-01-28 RX ADMIN — POTASSIUM BICARBONATE ONE MEQ: 978 TABLET, EFFERVESCENT ORAL at 17:45

## 2025-01-28 RX ADMIN — FUROSEMIDE SCH MG: 10 INJECTION, SOLUTION INTRAMUSCULAR; INTRAVENOUS at 10:00

## 2025-01-28 RX ADMIN — SPIRONOLACTONE ONE MG: 25 TABLET, FILM COATED ORAL at 14:00

## 2025-01-28 NOTE — DVHDSRES
Discharge Summary


Date of Admission


Resident Creating Document:  ISABELLA DERAS RESIDENT


Jan 24, 2025 at 21:48





Date of Discharge:


Jan 28, 2025





Admitting Diagnosis


Asymptomatic hypercalcemia


Possible acute appendicitis





Wounds:


No wound was present





Labs/Diagnostic Data:





                               Laboratory Results








Test


 1/28/25


10:20 1/28/25


05:32 1/27/25


14:00 1/27/25


05:21


 


Sodium Level


 141 mmol/L


(136-145) 


 


 





 


Potassium Level


 3.3 mmol/L


(3.5-5.1) 


 


 





 


Chloride Level


 103 mmol/L


() 


 


 





 


Carbon Dioxide Level


 30 mmol/L


(20-31) 


 


 





 


Anion Gap 8 (5-15)    


 


Blood Urea Nitrogen


 24 mg/dL


(9-23) 


 


 





 


Creatinine


 1.47 mg/dL


(0.700-1.30) 


 


 





 


Glomerular Filtration Rate


Calc 50 mL/min


(>90) 


 


 





 


BUN/Creatinine Ratio


 16.3


(10.0-20.0) 


 


 





 


Serum Glucose


 102 mg/dL


() 


 


 





 


Calcium Level


 12.1 mg/dL


(8.7-10.4) 


 


 





 


White Blood Count


 


 7.9 10^3/uL


(4.4-10.8) 


 





 


Red Blood Count


 


 3.73 10^6/uL


(4.5-5.90) 


 





 


Hemoglobin


 


 11.0 g/dL


(13.5-17.5) 


 





 


Hematocrit


 


 31.8 %


(41.0-53.0) 


 





 


Mean Corpuscular Volume


 


 85.2 fL


(80.0-100.0) 


 





 


Mean Corpuscular Hemoglobin


 


 29.5 pg


(28.0-32.0) 


 





 


Mean Corpuscular Hemoglobin


Concent 


 34.6 g/dL


(32.0-36.0) 


 





 


Red Cell Distribution Width


 


 15.2 %


(11.8-14.3) 


 





 


Platelet Count


 


 269 10^3/uL


(140-450) 


 





 


Mean Platelet Volume


 


 7.1 fL


(6.9-10.8) 


 





 


Neutrophils (%) (Auto)


 


 71.1 %


(37.0-80.0) 


 





 


Lymphocytes (%) (Auto)


 


 12.5 %


(10.0-50.0) 


 





 


Monocytes (%) (Auto)


 


 8.1 %


(0.0-12.0) 


 





 


Eosinophils (%) (Auto)


 


 7.2 %


(0.0-7.0) 


 





 


Basophils (%) (Auto)


 


 1.1 %


(0.0-2.0) 


 





 


Neutrophils # (Auto)


 


 5.6 10 ^3/uL


(1.6-8.6) 


 





 


Lymphocytes # (Auto)


 


 1.0 10 ^3/uL


(0.4-5.4) 


 





 


Monocytes # (Auto)


 


 0.6 10 ^3/uL


(0-1.3) 


 





 


Eosinophils # (Auto)


 


 0.6 10 ^3/uL


(0-0.8) 


 





 


Basophils # (Auto)


 


 0.1 10 ^3/uL


(0-0.2) 


 





 


Nucleated Red Blood Cells  0.2 %   


 


Lactate Dehydrogenase


 


 


 


 140 U/L


(120-246)


 


Carcinoembryonic Antigen


 


 


 


 < 0.50 ng/mL


(<=5.0)


 


Test


 1/25/25


05:02 1/24/25


22:04 1/24/25


16:08 1/24/25


15:28


 


Magnesium Level


 2.4 mg/dL


(1.6-2.6) 


 


 





 


Total Bilirubin


 0.7 mg/dL


(0.2-1.0) 


 


 





 


Aspartate Amino Transferase


(AST) 26 U/L (13-40) 


 


 


 





 


Alanine Aminotransferase (ALT) 36 U/L (7-40)    


 


Alkaline Phosphatase


 200 U/L


() 


 


 





 


Total Protein


 6.0 g/dL


(5.7-8.2) 


 


 





 


Albumin


 3.8 g/dL


(3.2-4.8) 


 


 





 


Phosphorus Level


 


 3.5 mg/dL


(2.4-5.1) 


 





 


B-Type Natriuretic Peptide


 


 35.27 pg/mL


(0-100) 


 





 


Free Prostate Specific Antigen


 


 0.57 ng/mL


(N/A) 


 





 


Percent Free Prostate


Specific Ag 


 27.1 % (.) 


 


 





 


Prostate Specific Antigen


Total 


 2.1 ng/mL


(0.0-4.0) 


 





 


Vitamin D 25-Hydroxy


 


 121.4 ng/mL


(30.0-100) 


 





 


Thyroid Stimulating Hormone


(TSH) 


 2.39 uIU/mL


(0.55-4.78) 


 





 


Parathyroid Hormone (Intact)


 


 4.8 pg/mL


(18.4-80.1) 


 





 


Prothrombin Time


 


 


 10.7 sec


(9.3-11.8) 





 


Prothrombin Time INR


 


 


 1.01


(0.9-1.15) 





 


Activated Partial


Thromboplast Time 


 


 24.3 SEC


(24.5-34.5) 





 


Lactic Acid Level


 


 


 1.7 mmol/L


(0.4-2.0) 





 


Urine Color


 


 


 


 Dark-yellow


(Yellow)


 


Urine Clarity    Clear (Clear) 


 


Urine pH    5.5 (5.0-9.0) 


 


Urine Specific Gravity


 


 


 


 1.011


(1.001-1.035)


 


Urine Protein


 


 


 


 Negative


(Negative)


 


Urine Ketones


 


 


 


 Negative


(Negative)


 


Urine Blood


 


 


 


 Negative /uL


(Negative)


 


Urine Nitrite    1+ (Negative) 


 


Urine Bilirubin    1+ (Negative) 


 


Urine Urobilinogen


 


 


 


 3 mg/dL


(Negative)


 


Urine Leukocyte Esterase


 


 


 


 Negative /uL


(Negative)


 


Urine RBC    4 /hpf (0 - 3) 


 


Urine Microscopic WBC    2 /HPF (0-3) 


 


Urine Squamous Epithelial


Cells 


 


 


 Few /hpf (<5) 





 


Urine Bacteria


 


 


 


 None seen /hpf


(None Seen)


 


Urine Glucose


 


 


 


 Normal mg/dL


(Normal)





                             Other Laboratory Tests


1/28/25 10:20








1/28/25 05:32











Brief Hx & Hospital Course:





This is a 74-year-old male with past medical history of atrial fibrillation s/p 

ablation in 2019, CAD s/p PTCA x3 with stenting of left main, lad and 

circumflex, ulcerative colitis diagnosed in April 2024, bilateral pulmonary 

embolisms, Parkinson disease, systolic heart failure, asthma, hypertension, who 

came in due to abdominal pain that has been ongoing for the past 2 weeks.  Per 

patient's wife, he has also been experiencing dysuria for the past 2 days along 

with some confusion and delusions/hallucinations.  They called patient's PCP who

prescribed Levaquin, patient took 1 dose of Levaquin yesterday on 01/22/2025.  

However, due to continued dysuria, confusion and worsening generalized weakness 

with inability to walk is what prompted this visit to the hospital. On admission

patient was noted to have hypercalcemia of 15.2 and subsequent measurement 

became 13.9>13.2, PTH 4.8 and vitamin D 121.4.





Hospital course: Initial CT abdomen pelvis without contrast: Mild fat stranding 

surrounding the appendix suggestive of acute appendicitis, there is no 

periappendiceal fluid collection or free air and Prostatomegaly . Surgery 

recommended conservative management and advised to full liquid diet and was 

treated with levofloxacin 500 mg p.o. daily and IV metronidazole 500 mg Q 8 

hours. For hypercalcemia workup, nephrology recommended check calcitriol, IV 

bisphosphonate IV Lasix and ordered a PSA LDH, metanephrine free fraction 

plasma.  CT abdomen pelvis with IV contrast enhanced did not mentioned anything 

about malignancy. Blood pressure was controlled with carvedilol to 12.5 mg 

b.i.d., losartan 100 mg daily, amlodipine 5 mg daily and recurrent hypo kalemia 

during the hospital stay was was supplemented with both IV and oral potassium.  

recurrent hypokalemia we ordered serum aldosterone and aldosterone renin ratio 

and treated the patient with a spironolactone 25 mg p.o. daily. LDH and CEA were

normal and pending results PSA, metanephrine free fraction plasma, serum 

aldosterone, aldosterone /renin ratio. Today calcium was 12.1 and potassium was 

3.3 which was replaced with 50 mEq oral potassium.  Today morning patient's 

blood pressure is stable and discharge plan was discussed with the wife and all 

questions were answered.  Patient is being discharged to home and advised 

continue home medication and also advised to check BNP in next Monday and also 

schedule appointment with PCP in 1 week, nephrology in 1-2 weeks and also 

outpatient Oncology for workup of malignancy.








Discharge diagnosis:





Asymptomatic subacute/chronic hypercalcemia likely due to hypervitaminosis vs. 

granulomatous disease


Possible malignancy


Possible acute uncomplicated appendicitis


History of ulcerative colitis, currently not on any treatment


Coronary artery disease s/p PTCA x3


Hypertension heart disease


Hypokalemia 


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 


History of Parkinson's disease 








Discharge Plan:





Disposition : Home


Medications:  Augmentin 875 mg p.o. b.i.d. for 3 days, aspirin 81 mg daily, 

carvedilol 12.5 mg b.i.d,  amlodipine 5 mg daily, spironolactone 25 mg daily, 

potassium chloride 20 meq daily, Xanax 0.5 mg b.i.d. and continue home 

medications.


Follow up  :  PCP in 1 week with [facility]


              Outpatient Nephrology 1 to 2 week


              Outpatient Oncology 1 to 2 week


              Outpatient Surgery 1 to 2 week





Diet       : Full liquid diet for 1 week.





Consults/Reason for consult


Nephrology , surgery and oncology were consulted.





Operations or Procedures


Exam: CT CT CHEST/AB/PL W CON- IV ONLY





History: malignancy  





COMPARISON: None





Technique: Multidetector spiral CT of the abdomen and pelvis was performed from 

lung bases to pubic symphysis.  Intravenous contrast was administered during 

this examination.  Portal venous  imaging was obtained.  Axial, coronal and 

sagittal multiplanar reformats were performed by the technologist on a separate 

workstation.





Radiation Dose : 





1. Abdomen/Pelvis:        CTDIvol 11 mGy, .01 mGy*cm.





CONTRAST:





Type of contrast: Omnipaque 300





Contrast injected: 100 ml 





Contrast ingested:0 ml 





Findings: 





Lung Bases: No acute or significant lung base finding.  Normal heart size. No 

pleural or pericardial effusion. Coronary artery calcification.  





Liver: The liver is normal in size.  No focal lesions. Normal hepatic vascular 

enhancement. Multiple small liver cysts.  





Gallbladder and biliary Tree: Patient status post cholecystectomy.





Spleen: Unremarkable





Pancreas: The pancreas is normal in appearance without focal lesions or abnormal

enhancement.





Adrenal Glands: Unremarkable 





Kidneys:   No hydronephrosis.





Bladder: Unremarkable





Bowel: Very minimal fat stranding adjacent to the appendix, not simply change 

from the prior study. No diverticulitis.





Ascites: Absent





Lymphadenopathy: No mesenteric, retroperitoneal or periportal lymphadenopathy. 





Abdominal wall and Mesentery: Unremarkable.





Vasculature: The visualized abdominal aorta is normal in size and caliber. 

Abdominal and pelvic vessels demonstrate normal enhancement. Mild ectasis of the

ascending aortia. No pulmonary emboli.





Pelvic Organs: Moderate enlargement of the prostate.





Musculoskeletal: Grade 1 spondylolisthesis of L5 on S1. Bilateral pars defects. 

Moderate lumbar spondylosis.   





IMPRESSION: 





1. No interval change in the minimal fat stranding surrounding the appendix. No 

abscess.





2. No significant change compared to the prior study.











ULTRASOUND RENAL





CLINICAL INDICATION: Renal artery stenosis





TECHNIQUE: Real-time sonographic, duplex, and color Doppler images of the 

kidneys were obtained.





FINDINGS: The right kidney measures 12 cm and is normal in size. The right renal

echogenicity, contour and cortical thickness are within normal limits. No 

hydronephrosis, large masses or perinephric fluid is seen.





The left kidney measures 12 cm and is normal in size. The left renal 

echogenicity, contour, and cortical thickness are within normal limits. no 

hydronephrosis, large masses or perinephric fluid is seen.





Right renal resistive index measures 0.8. Renal PSV/ aorta PSV ratio equals 1.3.





Left renal resistive index measures 0.74. Left renal PSV /aorta PSV ratio equals

0.7.





IMPRESSION: 





Mildly elevated right renal resistive index.  However consider MRA renal artery 

protocol to evaluate the right renal artery.





Condition at Discharge:


Guarded





Final Diagnosis/Problems List





Asymptomatic subacute/chronic hypercalcemia likely due to hypervitaminosis 


vs. granulomatous disease


Possible malignancy


Possible acute uncomplicated appendicitis


History of ulcerative colitis, currently not on any treatment


Coronary artery disease s/p PTCA x3


Hypertension heart disease


Hypokalemia 


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 


History of Parkinson's disease





Discharge Disposition:


Home





Discharge Instruct/Medications


Diet:  Cardiac 2g Na,low cholest


Activity:  No Restrictions, As Tolerated


Follow Up/Referral:  


Follow up with PCP in 1 week and advice to check BMP in next monday





Follow up outpatient oncology for workup of malignancy in 1 to 2 weeks





Follow up with outpatient nephrology in 1 to 2 weeks


Medications:  


As per EMR


Discharge Statement:


"Patient was advised to return to the ER or call 911 if any headaches, 

dizziness, shortness of breath, chest pain, abdominal pain, bleeding, fevers, or

worsening of medical condition.





Patient was counseled about treatment plan, medications, possible side effects, 

patientverbalized understanding. All questions were answered to the best of my 

ability.





This discharge took greater then 30 minutes in planning, reviewing 

documentation, counseling the patient, and discussing with other team members."





ASSESSMENT


Asymptomatic subacute/chronic hypercalcemia likely due to hypervitaminosis vs. 

granulomatous disease


Possible malignancy


Possible acute uncomplicated appendicitis


History of ulcerative colitis, currently not on any treatment


Coronary artery disease s/p PTCA x3


Hypertension heart disease


Hypokalemia 


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 


History of Parkinson's disease











ISABELLA DERAS RESIDENT        Jan 28, 2025 15:44